# Patient Record
Sex: MALE | Race: BLACK OR AFRICAN AMERICAN | NOT HISPANIC OR LATINO | Employment: UNEMPLOYED | URBAN - METROPOLITAN AREA
[De-identification: names, ages, dates, MRNs, and addresses within clinical notes are randomized per-mention and may not be internally consistent; named-entity substitution may affect disease eponyms.]

---

## 2017-03-01 ENCOUNTER — HOSPITAL ENCOUNTER (EMERGENCY)
Facility: HOSPITAL | Age: 52
Discharge: OTHER INSTITUTION | End: 2017-03-01
Attending: EMERGENCY MEDICINE
Payer: MEDICAID

## 2017-03-01 ENCOUNTER — HOSPITAL ENCOUNTER (INPATIENT)
Facility: HOSPITAL | Age: 52
LOS: 2 days | Discharge: HOME | DRG: 641 | End: 2017-03-03
Attending: HOSPITALIST | Admitting: HOSPITALIST
Payer: MEDICAID

## 2017-03-01 VITALS
TEMPERATURE: 97 F | OXYGEN SATURATION: 97 % | HEIGHT: 71 IN | WEIGHT: 162 LBS | BODY MASS INDEX: 22.68 KG/M2 | RESPIRATION RATE: 20 BRPM | HEART RATE: 46 BPM | SYSTOLIC BLOOD PRESSURE: 146 MMHG | DIASTOLIC BLOOD PRESSURE: 71 MMHG

## 2017-03-01 DIAGNOSIS — D72.829 LEUKOCYTOSIS, UNSPECIFIED TYPE: ICD-10-CM

## 2017-03-01 DIAGNOSIS — N28.89 BILATERAL RENAL MASSES: ICD-10-CM

## 2017-03-01 DIAGNOSIS — D72.829 LEUKOCYTOSIS, UNSPECIFIED TYPE: Primary | ICD-10-CM

## 2017-03-01 DIAGNOSIS — E83.52 HYPERCALCEMIA: ICD-10-CM

## 2017-03-01 DIAGNOSIS — Z72.0 TOBACCO ABUSE: ICD-10-CM

## 2017-03-01 DIAGNOSIS — R10.9 INTRACTABLE ABDOMINAL PAIN: Primary | ICD-10-CM

## 2017-03-01 PROBLEM — R11.2 NAUSEA AND VOMITING: Status: ACTIVE | Noted: 2017-03-01

## 2017-03-01 PROBLEM — I10 ESSENTIAL HYPERTENSION: Status: ACTIVE | Noted: 2017-03-01

## 2017-03-01 PROBLEM — N28.1 BILATERAL RENAL CYSTS: Status: ACTIVE | Noted: 2017-03-01

## 2017-03-01 PROBLEM — K21.9 GERD (GASTROESOPHAGEAL REFLUX DISEASE): Chronic | Status: ACTIVE | Noted: 2017-03-01

## 2017-03-01 PROBLEM — R17 TOTAL BILIRUBIN, ELEVATED: Status: ACTIVE | Noted: 2017-03-01

## 2017-03-01 LAB
ALBUMIN SERPL BCP-MCNC: 4.6 G/DL
ALP SERPL-CCNC: 72 U/L
ALT SERPL W/O P-5'-P-CCNC: 21 U/L
ANION GAP SERPL CALC-SCNC: 19 MMOL/L
ANISOCYTOSIS BLD QL SMEAR: SLIGHT
AST SERPL-CCNC: 23 U/L
BACTERIA #/AREA URNS HPF: NORMAL /HPF
BASOPHILS NFR BLD: 1 %
BILIRUB SERPL-MCNC: 1.4 MG/DL
BILIRUB UR QL STRIP: ABNORMAL
BUN SERPL-MCNC: 15 MG/DL
CALCIUM SERPL-MCNC: 12.2 MG/DL
CHLORIDE SERPL-SCNC: 97 MMOL/L
CLARITY UR: CLEAR
CO2 SERPL-SCNC: 21 MMOL/L
COLOR UR: YELLOW
CORTIS SERPL-MCNC: 39.9 UG/DL
CREAT SERPL-MCNC: 1.1 MG/DL
DIFFERENTIAL METHOD: ABNORMAL
EOSINOPHIL NFR BLD: 1 %
ERYTHROCYTE [DISTWIDTH] IN BLOOD BY AUTOMATED COUNT: 15.3 %
EST. GFR  (AFRICAN AMERICAN): >60 ML/MIN/1.73 M^2
EST. GFR  (NON AFRICAN AMERICAN): >60 ML/MIN/1.73 M^2
FLUAV AG SPEC QL IA: NEGATIVE
FLUBV AG SPEC QL IA: NEGATIVE
GLUCOSE SERPL-MCNC: 121 MG/DL
GLUCOSE UR QL STRIP: NEGATIVE
HCT VFR BLD AUTO: 48.4 %
HGB BLD-MCNC: 15.9 G/DL
HGB UR QL STRIP: ABNORMAL
HYALINE CASTS #/AREA URNS LPF: 0 /LPF
KETONES UR QL STRIP: ABNORMAL
LEUKOCYTE ESTERASE UR QL STRIP: NEGATIVE
LIPASE SERPL-CCNC: 12 U/L
LYMPHOCYTES NFR BLD: 18 %
MAGNESIUM SERPL-MCNC: 2.2 MG/DL
MCH RBC QN AUTO: 30 PG
MCHC RBC AUTO-ENTMCNC: 32.9 %
MCV RBC AUTO: 91 FL
MICROSCOPIC COMMENT: NORMAL
MONOCYTES NFR BLD: 3 %
NEUTROPHILS NFR BLD: 76 %
NEUTS BAND NFR BLD MANUAL: 1 %
NITRITE UR QL STRIP: NEGATIVE
PH UR STRIP: 6 [PH] (ref 5–8)
PLATELET # BLD AUTO: 359 K/UL
PLATELET BLD QL SMEAR: ABNORMAL
PMV BLD AUTO: 8.4 FL
POTASSIUM SERPL-SCNC: 3.8 MMOL/L
PROT SERPL-MCNC: 7.6 G/DL
PROT UR QL STRIP: ABNORMAL
RBC # BLD AUTO: 5.31 M/UL
RBC #/AREA URNS HPF: 1 /HPF (ref 0–4)
SODIUM SERPL-SCNC: 137 MMOL/L
SP GR UR STRIP: 1.01 (ref 1–1.03)
SPECIMEN SOURCE: NORMAL
SQUAMOUS #/AREA URNS HPF: 1 /HPF
TROPONIN I SERPL DL<=0.01 NG/ML-MCNC: 0.01 NG/ML
TSH SERPL DL<=0.005 MIU/L-ACNC: 0.44 UIU/ML
URN SPEC COLLECT METH UR: ABNORMAL
UROBILINOGEN UR STRIP-ACNC: NEGATIVE EU/DL
WBC # BLD AUTO: 18.5 K/UL
WBC #/AREA URNS HPF: 0 /HPF (ref 0–5)

## 2017-03-01 PROCEDURE — 85027 COMPLETE CBC AUTOMATED: CPT

## 2017-03-01 PROCEDURE — 84443 ASSAY THYROID STIM HORMONE: CPT

## 2017-03-01 PROCEDURE — 87400 INFLUENZA A/B EACH AG IA: CPT

## 2017-03-01 PROCEDURE — 11000001 HC ACUTE MED/SURG PRIVATE ROOM

## 2017-03-01 PROCEDURE — 63600175 PHARM REV CODE 636 W HCPCS: Performed by: EMERGENCY MEDICINE

## 2017-03-01 PROCEDURE — 85007 BL SMEAR W/DIFF WBC COUNT: CPT

## 2017-03-01 PROCEDURE — 96376 TX/PRO/DX INJ SAME DRUG ADON: CPT

## 2017-03-01 PROCEDURE — 96375 TX/PRO/DX INJ NEW DRUG ADDON: CPT

## 2017-03-01 PROCEDURE — 80053 COMPREHEN METABOLIC PANEL: CPT

## 2017-03-01 PROCEDURE — 96361 HYDRATE IV INFUSION ADD-ON: CPT

## 2017-03-01 PROCEDURE — 99285 EMERGENCY DEPT VISIT HI MDM: CPT | Mod: 25

## 2017-03-01 PROCEDURE — 84484 ASSAY OF TROPONIN QUANT: CPT

## 2017-03-01 PROCEDURE — 25500020 PHARM REV CODE 255

## 2017-03-01 PROCEDURE — 25000003 PHARM REV CODE 250: Performed by: EMERGENCY MEDICINE

## 2017-03-01 PROCEDURE — 25000003 PHARM REV CODE 250: Performed by: INTERNAL MEDICINE

## 2017-03-01 PROCEDURE — 96365 THER/PROPH/DIAG IV INF INIT: CPT | Mod: 59

## 2017-03-01 PROCEDURE — 63600175 PHARM REV CODE 636 W HCPCS: Performed by: INTERNAL MEDICINE

## 2017-03-01 PROCEDURE — 82533 TOTAL CORTISOL: CPT

## 2017-03-01 PROCEDURE — 81000 URINALYSIS NONAUTO W/SCOPE: CPT

## 2017-03-01 PROCEDURE — 83735 ASSAY OF MAGNESIUM: CPT

## 2017-03-01 PROCEDURE — 93005 ELECTROCARDIOGRAM TRACING: CPT

## 2017-03-01 PROCEDURE — 83690 ASSAY OF LIPASE: CPT

## 2017-03-01 PROCEDURE — 36415 COLL VENOUS BLD VENIPUNCTURE: CPT

## 2017-03-01 RX ORDER — MORPHINE SULFATE 2 MG/ML
6 INJECTION, SOLUTION INTRAMUSCULAR; INTRAVENOUS
Status: DISCONTINUED | OUTPATIENT
Start: 2017-03-01 | End: 2017-03-01

## 2017-03-01 RX ORDER — ONDANSETRON 4 MG/1
4 TABLET, ORALLY DISINTEGRATING ORAL EVERY 8 HOURS PRN
Status: DISCONTINUED | OUTPATIENT
Start: 2017-03-02 | End: 2017-03-02

## 2017-03-01 RX ORDER — ENOXAPARIN SODIUM 100 MG/ML
40 INJECTION SUBCUTANEOUS EVERY 24 HOURS
Status: DISCONTINUED | OUTPATIENT
Start: 2017-03-02 | End: 2017-03-02

## 2017-03-01 RX ORDER — DIPHENHYDRAMINE HYDROCHLORIDE 50 MG/ML
12.5 INJECTION INTRAMUSCULAR; INTRAVENOUS
Status: COMPLETED | OUTPATIENT
Start: 2017-03-01 | End: 2017-03-01

## 2017-03-01 RX ORDER — OXYCODONE HYDROCHLORIDE 5 MG/1
5 TABLET ORAL EVERY 6 HOURS PRN
Status: DISCONTINUED | OUTPATIENT
Start: 2017-03-02 | End: 2017-03-02

## 2017-03-01 RX ORDER — MORPHINE SULFATE 4 MG/ML
4 INJECTION, SOLUTION INTRAMUSCULAR; INTRAVENOUS ONCE
Status: COMPLETED | OUTPATIENT
Start: 2017-03-01 | End: 2017-03-01

## 2017-03-01 RX ORDER — ONDANSETRON 2 MG/ML
4 INJECTION INTRAMUSCULAR; INTRAVENOUS ONCE
Status: COMPLETED | OUTPATIENT
Start: 2017-03-01 | End: 2017-03-01

## 2017-03-01 RX ORDER — HYDROMORPHONE HYDROCHLORIDE 1 MG/ML
1 INJECTION, SOLUTION INTRAMUSCULAR; INTRAVENOUS; SUBCUTANEOUS
Status: COMPLETED | OUTPATIENT
Start: 2017-03-01 | End: 2017-03-01

## 2017-03-01 RX ORDER — ONDANSETRON 2 MG/ML
4 INJECTION INTRAMUSCULAR; INTRAVENOUS
Status: COMPLETED | OUTPATIENT
Start: 2017-03-01 | End: 2017-03-01

## 2017-03-01 RX ORDER — HYDROMORPHONE HYDROCHLORIDE 1 MG/ML
0.25 INJECTION, SOLUTION INTRAMUSCULAR; INTRAVENOUS; SUBCUTANEOUS
Status: COMPLETED | OUTPATIENT
Start: 2017-03-01 | End: 2017-03-01

## 2017-03-01 RX ORDER — LISINOPRIL 10 MG/1
10 TABLET ORAL DAILY
Status: DISCONTINUED | OUTPATIENT
Start: 2017-03-01 | End: 2017-03-03

## 2017-03-01 RX ORDER — HYDROMORPHONE HYDROCHLORIDE 1 MG/ML
0.5 INJECTION, SOLUTION INTRAMUSCULAR; INTRAVENOUS; SUBCUTANEOUS EVERY 6 HOURS PRN
Status: DISCONTINUED | OUTPATIENT
Start: 2017-03-01 | End: 2017-03-01 | Stop reason: HOSPADM

## 2017-03-01 RX ORDER — SODIUM CHLORIDE 9 MG/ML
1000 INJECTION, SOLUTION INTRAVENOUS
Status: COMPLETED | OUTPATIENT
Start: 2017-03-01 | End: 2017-03-01

## 2017-03-01 RX ORDER — ONDANSETRON 2 MG/ML
4 INJECTION INTRAMUSCULAR; INTRAVENOUS EVERY 8 HOURS PRN
Status: DISCONTINUED | OUTPATIENT
Start: 2017-03-02 | End: 2017-03-02

## 2017-03-01 RX ADMIN — IOHEXOL 75 ML: 350 INJECTION, SOLUTION INTRAVENOUS at 01:03

## 2017-03-01 RX ADMIN — SODIUM CHLORIDE 1000 ML: 0.9 INJECTION, SOLUTION INTRAVENOUS at 02:03

## 2017-03-01 RX ADMIN — DIPHENHYDRAMINE HYDROCHLORIDE 12.5 MG: 50 INJECTION, SOLUTION INTRAMUSCULAR; INTRAVENOUS at 08:03

## 2017-03-01 RX ADMIN — LIDOCAINE HYDROCHLORIDE: 20 SOLUTION ORAL; TOPICAL at 12:03

## 2017-03-01 RX ADMIN — PROMETHAZINE HYDROCHLORIDE 12.5 MG: 25 INJECTION, SOLUTION INTRAMUSCULAR; INTRAVENOUS at 03:03

## 2017-03-01 RX ADMIN — LISINOPRIL 10 MG: 10 TABLET ORAL at 11:03

## 2017-03-01 RX ADMIN — HYDROMORPHONE HYDROCHLORIDE 0.25 MG: 1 INJECTION, SOLUTION INTRAMUSCULAR; INTRAVENOUS; SUBCUTANEOUS at 08:03

## 2017-03-01 RX ADMIN — SODIUM CHLORIDE 1000 ML: 0.9 INJECTION, SOLUTION INTRAVENOUS at 12:03

## 2017-03-01 RX ADMIN — HYDROMORPHONE HYDROCHLORIDE 0.5 MG: 1 INJECTION, SOLUTION INTRAMUSCULAR; INTRAVENOUS; SUBCUTANEOUS at 05:03

## 2017-03-01 RX ADMIN — ONDANSETRON 4 MG: 2 INJECTION INTRAMUSCULAR; INTRAVENOUS at 05:03

## 2017-03-01 RX ADMIN — HYDROMORPHONE HYDROCHLORIDE 1 MG: 1 INJECTION, SOLUTION INTRAMUSCULAR; INTRAVENOUS; SUBCUTANEOUS at 12:03

## 2017-03-01 RX ADMIN — ONDANSETRON 4 MG: 2 INJECTION INTRAMUSCULAR; INTRAVENOUS at 11:03

## 2017-03-01 RX ADMIN — PROMETHAZINE HYDROCHLORIDE 12.5 MG: 25 INJECTION, SOLUTION INTRAMUSCULAR; INTRAVENOUS at 12:03

## 2017-03-01 RX ADMIN — MORPHINE SULFATE 4 MG: 4 INJECTION INTRAVENOUS at 11:03

## 2017-03-01 NOTE — ED PROVIDER NOTES
"Encounter Date: 3/1/2017    SCRIBE #1 NOTE: I, Shivanirachel Spangler, am scribing for, and in the presence of, Dr. Gallagher.       History     Chief Complaint   Patient presents with    Abdominal Pain     "gastritis".  Hx of same.  Onset of symptoms last night.  Nausea, Vomiting.  Diarrhea.      Review of patient's allergies indicates:  No Known Allergies  HPI Comments: 3/1/2017  11:41 AM     Chief Complaint: Abdominal pain    The patient is a 51 y.o. male with no pmhx who presents with abdominal pain on and off for years. Patient states his current sharp lower abdominal pain started yesterday. Associated symptoms include nausea, vomiting, diarrhea and black stools. Pt has a hx of acid reflux and was taking nexium but ran out of his prescription. Patient does admit to drinking alcohol over the [place] holiday this past weekend. No fever, cp or sob. No known hx of pancreatitis. No shx.     The history is provided by the patient.     No past medical history on file.  No past surgical history on file.  No family history on file.  Social History   Substance Use Topics    Smoking status: Not on file    Smokeless tobacco: Not on file    Alcohol use Not on file     Review of Systems   Constitutional: Negative for appetite change, chills and fever.   HENT: Negative for congestion, rhinorrhea and sore throat.    Respiratory: Negative for cough and shortness of breath.    Cardiovascular: Negative for chest pain.   Gastrointestinal: Positive for abdominal pain, diarrhea, nausea and vomiting.        +Black stools.   Genitourinary: Negative for dysuria.   Musculoskeletal: Negative for back pain and myalgias.   Skin: Negative for rash.   Neurological: Negative for weakness and numbness.   Hematological: Does not bruise/bleed easily.   All other systems reviewed and are negative.      Physical Exam   Initial Vitals   BP Pulse Resp Temp SpO2   03/01/17 1108 03/01/17 1108 03/01/17 1108 03/01/17 1108 03/01/17 1108   181/90 66 20 97.5 " °F (36.4 °C) 98 %     Physical Exam    Nursing note and vitals reviewed.  Constitutional: No distress.   HENT:   Head: Normocephalic and atraumatic.   Mouth/Throat: Oropharynx is clear and moist and mucous membranes are normal.   Eyes: EOM are normal. Pupils are equal, round, and reactive to light.   Neck: Normal range of motion. Neck supple.   Cardiovascular: Normal rate, regular rhythm, normal heart sounds and intact distal pulses. Exam reveals no gallop and no friction rub.    No murmur heard.  CTA.   Pulmonary/Chest: Breath sounds normal. He has no wheezes. He has no rhonchi. He has no rales.   Abdominal: Soft. He exhibits no distension. There is generalized tenderness and tenderness in the right lower quadrant, suprapubic area and left lower quadrant. There is no rigidity, no rebound and no guarding.   Hypermobile bowel sounds.   Musculoskeletal: Normal range of motion. He exhibits no edema.   Neurological: He is alert and oriented to person, place, and time. He has normal strength.   Skin: Skin is dry. No rash noted. No erythema.   Psychiatric: He has a normal mood and affect.         ED Course   Procedures  Labs Reviewed - No data to display  EKG Readings: (Independently Interpreted)   Sinus bradycardia at a rate of 50. Voltage criteria for LDH. Precordial leads. Early repolarization. Normal axis and intervals.          Medical Decision Making:   Initial Assessment:   Patient has a few concerning hallmarks of his history and examination.  He has a diffusely tender abdomen that is nonrigid.  He reports he has been progressing with weight loss for some time of unknown etiology.  Screening blood work and imaging, including a CT scan of the abdomen and pelvis with IV dye will be obtained.  Differential Diagnosis:   DX include, but are not limited to, intra-abdominal carcinoma, appendicitis, pancreatitis, small bowel obstruction, ileus, pyelonephritis  ED Management:  Patient required multiple doses of analgesic  and antiemetic medication.  Workup today significant for a leukocytosis of 18.5 without focal source of infection in the abdomen.  Calcium is noted be markedly elevated at 12.2.  He was provided multiple boluses of saline.  CT scan indicates low density masses in the upper poles each kidney which appear small this time.  Patient does warrant further consultation for workup of his hypercalcemia and for pain control.  He is in agreement with this.  I did speak with the on-call endocrinologist at [facility], as we do not have that service here regarding acceptance for consultation to which they agreed.  I spoke to the on-call hospitalist at [facility] who agreed to see the patient on transfer.  Transferred per EMS in guarded condition.            Scribe Attestation:   Scribe #1: I performed the above scribed service and the documentation accurately describes the services I performed. I attest to the accuracy of the note.    Attending Attestation:           Physician Attestation for Scribe:  Physician Attestation Statement for Scribe #1: I, Dr. Gallagher, reviewed documentation, as scribed by Shivani Spangler in my presence, and it is both accurate and complete.                 ED Course     Clinical Impression:   The primary encounter diagnosis was Intractable abdominal pain. Diagnoses of Leukocytosis, unspecified type, Hypercalcemia, and Bilateral renal masses were also pertinent to this visit.      Disposition:   Disposition: Transferred  Condition: Pepito Gallagher MD  03/01/17 2026

## 2017-03-01 NOTE — ED NOTES
Pt sleeping with blanket over head, diaphoretic but states he's cold. Afebrile. Family member at bedside.

## 2017-03-01 NOTE — IP AVS SNAPSHOT
[facility]  [address]  [facility] [address]  Phone: [phone]           Patient Discharge Instructions     Our goal is to set you up for success. This packet includes information on your condition, medications, and your home care. It will help you to care for yourself so you don't get sicker and need to go back to the hospital.     Please ask your nurse if you have any questions.        There are many details to remember when preparing to leave the hospital. Here is what you will need to do:    1. Take your medicine. If you are prescribed medications, review your Medication List in the following pages. You may have new medications to  at the pharmacy and others that you'll need to stop taking. Review the instructions for how and when to take your medications. Talk with your doctor or nurses if you are unsure of what to do.     2. Go to your follow-up appointments. Specific follow-up information is listed in the following pages. Your may be contacted by a transition nurse or clinical provider about future appointments. Be sure we have all of the phone numbers to reach you, if needed. Please contact your provider's office if you are unable to make an appointment.     3. Watch for warning signs. Your doctor or nurse will give you detailed warning signs to watch for and when to call for assistance. These instructions may also include educational information about your condition. If you experience any of warning signs to your health, call your doctor.               [facility]  Unless otherwise directed by your provider, please contact [facility], our nurse care line that is available for 24/7 assistance.     [phone] (toll-free)    Registered nurses in the [facility] provide clinical advisement, health education, appointment booking, and other advisory services.                    ** Verify the list of medication(s) below is accurate and up  to date. Carry this with you in case of emergency. If your medications have changed, please notify your healthcare provider.             Medication List      START taking these medications        Additional Info                      ciprofloxacin HCl 500 MG tablet   Commonly known as:  CIPRO   Quantity:  26 tablet   Refills:  0   Dose:  500 mg   Indications:  Urinary Tract Infection    Last time this was given:  500 mg on 3/3/2017  8:55 AM   Instructions:  Take 1 tablet (500 mg total) by mouth every 12 (twelve) hours.     Begin Date    AM    Noon    PM    Bedtime       nicotine 14 mg/24 hr   Commonly known as:  NICODERM CQ   Refills:  0   Dose:  1 patch    Last time this was given:  1 patch on 3/3/2017  8:55 AM   Instructions:  Place 1 patch onto the skin once daily.     Begin Date    AM    Noon    PM    Bedtime       pantoprazole 40 MG tablet   Commonly known as:  PROTONIX   Quantity:  30 tablet   Refills:  2   Dose:  40 mg    Last time this was given:  40 mg on 3/3/2017  8:55 AM   Instructions:  Take 1 tablet (40 mg total) by mouth once daily.     Begin Date    AM    Noon    PM    Bedtime       promethazine 12.5 MG Tab   Commonly known as:  PHENERGAN   Quantity:  30 tablet   Refills:  1   Dose:  12.5 mg    Last time this was given:  12.5 mg on 3/3/2017  5:23 AM   Instructions:  Take 1 tablet (12.5 mg total) by mouth every 6 (six) hours as needed.     Begin Date    AM    Noon    PM    Bedtime       tamsulosin 0.4 mg Cp24   Commonly known as:  FLOMAX   Quantity:  30 capsule   Refills:  2   Dose:  0.4 mg    Last time this was given:  0.4 mg on 3/3/2017  8:54 AM   Instructions:  Take 1 capsule (0.4 mg total) by mouth once daily.     Begin Date    AM    Noon    PM    Bedtime         STOP taking these medications     ranitidine 150 MG tablet   Commonly known as:  ZANTAC            Where to Get Your Medications      These medications were sent to [facility] [place] [address]  [address] [facility] [address]     Phone:  [phone]     ciprofloxacin HCl 500 MG tablet    pantoprazole 40 MG tablet    promethazine 12.5 MG Tab    tamsulosin 0.4 mg Cp24         You can get these medications from any pharmacy     You don't need a prescription for these medications     nicotine 14 mg/24 hr                  Please bring to all follow up appointments:    1. A copy of your discharge instructions.  2. All medicines you are currently taking in their original bottles.  3. Identification and insurance card.    Please arrive 15 minutes ahead of scheduled appointment time.    Please call 24 hours in advance if you must reschedule your appointment and/or time.        Your Scheduled Appointments     Mar 09, 2017 11:00 AM [facility]   Hospital Follow Up with JAREK Andujar [facility] ([facility])    [address]  [place] [address] [phone]              Follow-up Information     Follow up with [facility] On 3/9/2017.    Specialty:  Priority Care    Why:  11:00am - bring a copy of your discharge paper work    Contact information:    [address] [facility] [address] [phone]    Additional information:    [facility]        Discharge Instructions     Future Orders    Activity as tolerated     Call MD for:  difficulty breathing or increased cough     Call MD for:  increased confusion or weakness     Call MD for:  persistent dizziness, light-headedness, or visual disturbances     Call MD for:  persistent nausea and vomiting or diarrhea     Call MD for:  redness, tenderness, or signs of infection (pain, swelling, redness, odor or green/yellow discharge around incision site)     Call MD for:  severe persistent headache     Call MD for:  severe uncontrolled pain     Call MD for:  temperature >100.4     Call MD for:  worsening rash  "    Diet general     Questions:    Total calories:      Fat restriction, if any:      Protein restriction, if any:      Na restriction, if any:      Fluid restriction:      Additional restrictions:        Discharge References/Attachments     GERD (ADULT) (ENGLISH)        Primary Diagnosis     Your primary diagnosis was:  Intractable Vomiting With Nausea      Admission Information     Date & Time Provider Department CSN    3/1/2017 11:02 PM Randolph Saucedo MD [facility] [id number]      Care Providers     Provider Role Specialty Primary office phone    Randolph Saucedo MD Attending Provider Hospitalist [phone]    Randolph Saucedo MD Team Attending  Hospitalist [phone]      Your Vitals Were     BP Pulse Temp Resp Height Weight    110/66 (BP Location: Left arm, Patient Position: Lying, BP Method: Automatic) 57 98.5 °F (36.9 °C) (Oral) 16 5' 11" (1.803 m) 74.8 kg (165 lb)    SpO2 BMI             98% 23.01 kg/m2         Recent Lab Values        3/2/2017                           8:31 AM           A1C 4.9           Comment for A1C at  8:31 AM on 3/2/2017:  According to ADA guidelines, hemoglobin A1C <7.0% represents  optimal control in non-pregnant diabetic patients.  Different  metrics may apply to specific populations.   Standards of Medical Care in Diabetes - 2016.  For the purpose of screening for the presence of diabetes:  <5.7%     Consistent with the absence of diabetes  5.7-6.4%  Consistent with increasing risk for diabetes   (prediabetes)  >or=6.5%  Consistent with diabetes  Currently no consensus exists for use of hemoglobin A1C  for diagnosis of diabetes for children.        Pending Labs     Order Current Status    PTH-related peptide In process      Allergies as of 3/3/2017     No Known Allergies      Advance Directives     An advance directive is a document which, in the event you are no longer able to make decisions for yourself, tells your healthcare team what kind of treatment you do or " do not want to receive, or who you would like to make those decisions for you.  If you do not currently have an advance directive, [facility] encourages you to create one.  For more information call:  [phone]), [phone] ([phone]),  or log on to [email or web address].        Smoking Cessation     If you would like to quit smoking:   You may be eligible for free services if you are a [place] resident and started smoking cigarettes before September 1, 1988.  Call the Smoking Cessation Trust (SCT) toll free at (489) 774-1840 or (899) 532-1630.   Call 8-900-QUIT-NOW if you do not meet the above criteria.            Language Assistance Services     ATTENTION: Language assistance services are available, free of charge. Please call [phone].      ATENCIÓN: Si habla español, tiene a mejia disposición servicios gratuitos de asistencia lingüística. Llame al [phone].     CHÚ Ý: N?u b?n nói Ti?ng Vi?t, có các d?ch v? h? tr? ngôn ng? mi?n phí dành cho b?n. G?i s? [phone].        [facility] Sign-Up     Activating your [facility] account is as easy as 1-2-3!     1) Visit [email or web address], select Sign Up Now, enter this activation code and your date of birth, then select Next.  [email or web address]  Expires: 4/17/2017 11:17 AM      2) Create a username and password to use when you visit [facility] in the future and select a security question in case you lose your password and select Next.    3) Enter your e-mail address and click Sign Up!    Additional Information  If you have questions, please e-mail [email or web address] or call [phone] to talk to our [facility] staff. Remember, [facility] is NOT to be used for urgent needs. For medical emergencies, dial 911.          [facility] complies with applicable Federal civil rights laws and does not discriminate on the basis of race, color, national origin, age, disability, or sex.

## 2017-03-01 NOTE — PLAN OF CARE
Outside Transfer Acceptance Note    Transferring Physician or Mid Level Provider/Speciality: Dr Gallagher    Accepting Physician: Nell Ellsworth     Date of Acceptance: 03/01/2017     Code Status: Full    Transferring Facility/Hospital: [facility]    Reason for Transfer to [facility]: hypercalcemia, abd pain.  Nausea and vomiting.     Report from Transferring Physician or Mid-Level provider/Hospital course: 51 M with a PMH of GERD who presented to the ED today with intractable abdominal pain, nausea and vomiting x 3 day.  Also complains of weight loss and general malaise for the last month or so.  Calcium 12.2.  Low-density masses in the upper poles of each kidney which are too small to characterize. Spoke with endocrinology here who would like him to come over for a workup.     To do list upon patient arrival: consult endocrine as it was the reason for transfer.  Would also initiate general workup for hypercalcemia.     Please call extension [phone] upon patient arrival to floor for Hospital Medicine admit team assignment and for additional admit orders. If patient is coming from another [facility] facility please also call [phone] to inform the admit team/office that patient has arrived from the [facility] facility to the floor so patient can be evaluated.

## 2017-03-01 NOTE — ED NOTES
"Pt presents to ED with c/o nausea, vomiting, and diarrhea that began last night. Pt reports "it feels like my gastritis."  Pt is AAOx4. Skin is diaphoretic. Pt is not vomiting at this time. Pt reports generalized abdominal tenderness with palpation. Mucous membranes are pink and moist. VSS.    "

## 2017-03-02 PROBLEM — N40.0 PROSTATE ENLARGEMENT: Status: ACTIVE | Noted: 2017-03-02

## 2017-03-02 PROBLEM — R74.8 ABNORMAL LIVER ENZYMES: Status: ACTIVE | Noted: 2017-03-01

## 2017-03-02 PROBLEM — E86.0 DEHYDRATION: Status: ACTIVE | Noted: 2017-03-02

## 2017-03-02 PROBLEM — K76.0 HEPATIC STEATOSIS: Status: ACTIVE | Noted: 2017-03-02

## 2017-03-02 PROBLEM — N41.1 CHRONIC PROSTATITIS: Status: ACTIVE | Noted: 2017-03-02

## 2017-03-02 PROBLEM — K21.9 GASTROESOPHAGEAL REFLUX DISEASE WITHOUT ESOPHAGITIS: Status: ACTIVE | Noted: 2017-03-01

## 2017-03-02 PROBLEM — N41.0 ACUTE PROSTATITIS: Status: ACTIVE | Noted: 2017-03-02

## 2017-03-02 PROBLEM — Z72.0 TOBACCO ABUSE: Status: ACTIVE | Noted: 2017-03-02

## 2017-03-02 LAB
25(OH)D3+25(OH)D2 SERPL-MCNC: <8 NG/ML
ALBUMIN SERPL BCP-MCNC: 3.7 G/DL
ALP SERPL-CCNC: 61 U/L
ALT SERPL W/O P-5'-P-CCNC: 16 U/L
ANION GAP SERPL CALC-SCNC: 10 MMOL/L
ANION GAP SERPL CALC-SCNC: 11 MMOL/L
AST SERPL-CCNC: 16 U/L
BASOPHILS # BLD AUTO: 0.02 K/UL
BASOPHILS NFR BLD: 0.2 %
BILIRUB SERPL-MCNC: 1.3 MG/DL
BILIRUB UR QL STRIP: NEGATIVE
BUN SERPL-MCNC: 11 MG/DL
BUN SERPL-MCNC: 12 MG/DL
CA-I BLDV-SCNC: 1.34 MMOL/L
CALCIUM SERPL-MCNC: 10.8 MG/DL
CALCIUM SERPL-MCNC: 10.9 MG/DL
CHLORIDE SERPL-SCNC: 100 MMOL/L
CHLORIDE SERPL-SCNC: 102 MMOL/L
CLARITY UR REFRACT.AUTO: ABNORMAL
CO2 SERPL-SCNC: 25 MMOL/L
CO2 SERPL-SCNC: 25 MMOL/L
COLOR UR AUTO: YELLOW
CREAT SERPL-MCNC: 0.9 MG/DL
CREAT SERPL-MCNC: 1 MG/DL
DIFFERENTIAL METHOD: ABNORMAL
EOSINOPHIL # BLD AUTO: 0 K/UL
EOSINOPHIL NFR BLD: 0.2 %
ERYTHROCYTE [DISTWIDTH] IN BLOOD BY AUTOMATED COUNT: 14.9 %
EST. GFR  (AFRICAN AMERICAN): >60 ML/MIN/1.73 M^2
EST. GFR  (AFRICAN AMERICAN): >60 ML/MIN/1.73 M^2
EST. GFR  (NON AFRICAN AMERICAN): >60 ML/MIN/1.73 M^2
EST. GFR  (NON AFRICAN AMERICAN): >60 ML/MIN/1.73 M^2
ESTIMATED AVG GLUCOSE: 94 MG/DL
GLUCOSE SERPL-MCNC: 112 MG/DL
GLUCOSE SERPL-MCNC: 99 MG/DL
GLUCOSE UR QL STRIP: NEGATIVE
HBA1C MFR BLD HPLC: 4.9 %
HCT VFR BLD AUTO: 43.7 %
HGB BLD-MCNC: 14.7 G/DL
HGB UR QL STRIP: NEGATIVE
KETONES UR QL STRIP: ABNORMAL
LEUKOCYTE ESTERASE UR QL STRIP: NEGATIVE
LYMPHOCYTES # BLD AUTO: 1.6 K/UL
LYMPHOCYTES NFR BLD: 12.8 %
MAGNESIUM SERPL-MCNC: 2.2 MG/DL
MCH RBC QN AUTO: 30.9 PG
MCHC RBC AUTO-ENTMCNC: 33.6 %
MCV RBC AUTO: 92 FL
MONOCYTES # BLD AUTO: 1.4 K/UL
MONOCYTES NFR BLD: 11.1 %
NEUTROPHILS # BLD AUTO: 9.3 K/UL
NEUTROPHILS NFR BLD: 75.5 %
NITRITE UR QL STRIP: NEGATIVE
PH UR STRIP: 6 [PH] (ref 5–8)
PHOSPHATE SERPL-MCNC: 2.5 MG/DL
PLATELET # BLD AUTO: 293 K/UL
PMV BLD AUTO: 10.3 FL
POTASSIUM SERPL-SCNC: 4.1 MMOL/L
POTASSIUM SERPL-SCNC: 4.1 MMOL/L
PROCALCITONIN SERPL IA-MCNC: <0.09 NG/ML
PROT SERPL-MCNC: 6.5 G/DL
PROT UR QL STRIP: ABNORMAL
PTH-INTACT SERPL-MCNC: 78 PG/ML
PTH-INTACT SERPL-MCNC: 96 PG/ML
RBC # BLD AUTO: 4.75 M/UL
SODIUM SERPL-SCNC: 135 MMOL/L
SODIUM SERPL-SCNC: 138 MMOL/L
SP GR UR STRIP: 1.01 (ref 1–1.03)
URN SPEC COLLECT METH UR: ABNORMAL
UROBILINOGEN UR STRIP-ACNC: NEGATIVE EU/DL
WBC # BLD AUTO: 12.34 K/UL

## 2017-03-02 PROCEDURE — 82330 ASSAY OF CALCIUM: CPT

## 2017-03-02 PROCEDURE — 83970 ASSAY OF PARATHORMONE: CPT | Mod: 91

## 2017-03-02 PROCEDURE — 99223 1ST HOSP IP/OBS HIGH 75: CPT | Mod: ,,, | Performed by: INTERNAL MEDICINE

## 2017-03-02 PROCEDURE — 93010 ELECTROCARDIOGRAM REPORT: CPT | Mod: ,,, | Performed by: INTERNAL MEDICINE

## 2017-03-02 PROCEDURE — 25000003 PHARM REV CODE 250: Performed by: STUDENT IN AN ORGANIZED HEALTH CARE EDUCATION/TRAINING PROGRAM

## 2017-03-02 PROCEDURE — 11000001 HC ACUTE MED/SURG PRIVATE ROOM

## 2017-03-02 PROCEDURE — 36415 COLL VENOUS BLD VENIPUNCTURE: CPT

## 2017-03-02 PROCEDURE — 82306 VITAMIN D 25 HYDROXY: CPT

## 2017-03-02 PROCEDURE — 80048 BASIC METABOLIC PNL TOTAL CA: CPT

## 2017-03-02 PROCEDURE — 63600175 PHARM REV CODE 636 W HCPCS: Performed by: INTERNAL MEDICINE

## 2017-03-02 PROCEDURE — 84100 ASSAY OF PHOSPHORUS: CPT

## 2017-03-02 PROCEDURE — 25000003 PHARM REV CODE 250: Performed by: INTERNAL MEDICINE

## 2017-03-02 PROCEDURE — 84145 PROCALCITONIN (PCT): CPT

## 2017-03-02 PROCEDURE — 63600175 PHARM REV CODE 636 W HCPCS: Performed by: STUDENT IN AN ORGANIZED HEALTH CARE EDUCATION/TRAINING PROGRAM

## 2017-03-02 PROCEDURE — 80053 COMPREHEN METABOLIC PANEL: CPT

## 2017-03-02 PROCEDURE — 93005 ELECTROCARDIOGRAM TRACING: CPT

## 2017-03-02 PROCEDURE — 85025 COMPLETE CBC W/AUTO DIFF WBC: CPT

## 2017-03-02 PROCEDURE — 82397 CHEMILUMINESCENT ASSAY: CPT

## 2017-03-02 PROCEDURE — 83735 ASSAY OF MAGNESIUM: CPT

## 2017-03-02 PROCEDURE — C9113 INJ PANTOPRAZOLE SODIUM, VIA: HCPCS | Performed by: STUDENT IN AN ORGANIZED HEALTH CARE EDUCATION/TRAINING PROGRAM

## 2017-03-02 PROCEDURE — 82652 VIT D 1 25-DIHYDROXY: CPT

## 2017-03-02 PROCEDURE — 83036 HEMOGLOBIN GLYCOSYLATED A1C: CPT

## 2017-03-02 PROCEDURE — 83970 ASSAY OF PARATHORMONE: CPT

## 2017-03-02 PROCEDURE — 81003 URINALYSIS AUTO W/O SCOPE: CPT

## 2017-03-02 RX ORDER — CIPROFLOXACIN 500 MG/1
500 TABLET ORAL EVERY 12 HOURS
Status: DISCONTINUED | OUTPATIENT
Start: 2017-03-02 | End: 2017-03-03 | Stop reason: HOSPADM

## 2017-03-02 RX ORDER — PROMETHAZINE HYDROCHLORIDE 12.5 MG/1
12.5 TABLET ORAL EVERY 6 HOURS PRN
Status: DISCONTINUED | OUTPATIENT
Start: 2017-03-02 | End: 2017-03-02

## 2017-03-02 RX ORDER — MORPHINE SULFATE 2 MG/ML
2 INJECTION, SOLUTION INTRAMUSCULAR; INTRAVENOUS EVERY 4 HOURS PRN
Status: DISCONTINUED | OUTPATIENT
Start: 2017-03-02 | End: 2017-03-02

## 2017-03-02 RX ORDER — IBUPROFEN 200 MG
1 TABLET ORAL DAILY
Status: DISCONTINUED | OUTPATIENT
Start: 2017-03-02 | End: 2017-03-03 | Stop reason: HOSPADM

## 2017-03-02 RX ORDER — PANTOPRAZOLE SODIUM 40 MG/10ML
40 INJECTION, POWDER, LYOPHILIZED, FOR SOLUTION INTRAVENOUS ONCE
Status: COMPLETED | OUTPATIENT
Start: 2017-03-02 | End: 2017-03-02

## 2017-03-02 RX ORDER — TAMSULOSIN HYDROCHLORIDE 0.4 MG/1
0.4 CAPSULE ORAL DAILY
Status: DISCONTINUED | OUTPATIENT
Start: 2017-03-02 | End: 2017-03-03 | Stop reason: HOSPADM

## 2017-03-02 RX ORDER — ONDANSETRON 2 MG/ML
8 INJECTION INTRAMUSCULAR; INTRAVENOUS EVERY 8 HOURS PRN
Status: DISCONTINUED | OUTPATIENT
Start: 2017-03-02 | End: 2017-03-03 | Stop reason: HOSPADM

## 2017-03-02 RX ORDER — PANTOPRAZOLE SODIUM 40 MG/1
40 TABLET, DELAYED RELEASE ORAL DAILY
Status: DISCONTINUED | OUTPATIENT
Start: 2017-03-03 | End: 2017-03-03 | Stop reason: HOSPADM

## 2017-03-02 RX ORDER — ACETAMINOPHEN 325 MG/1
650 TABLET ORAL EVERY 6 HOURS PRN
Status: DISCONTINUED | OUTPATIENT
Start: 2017-03-02 | End: 2017-03-03 | Stop reason: HOSPADM

## 2017-03-02 RX ORDER — PROMETHAZINE HYDROCHLORIDE 12.5 MG/1
12.5 TABLET ORAL EVERY 6 HOURS
Status: DISCONTINUED | OUTPATIENT
Start: 2017-03-02 | End: 2017-03-03 | Stop reason: HOSPADM

## 2017-03-02 RX ORDER — SODIUM CHLORIDE, SODIUM LACTATE, POTASSIUM CHLORIDE, CALCIUM CHLORIDE 600; 310; 30; 20 MG/100ML; MG/100ML; MG/100ML; MG/100ML
INJECTION, SOLUTION INTRAVENOUS CONTINUOUS
Status: ACTIVE | OUTPATIENT
Start: 2017-03-02 | End: 2017-03-02

## 2017-03-02 RX ADMIN — MORPHINE SULFATE 2 MG: 2 INJECTION, SOLUTION INTRAMUSCULAR; INTRAVENOUS at 09:03

## 2017-03-02 RX ADMIN — PANTOPRAZOLE SODIUM 40 MG: 40 INJECTION, POWDER, FOR SOLUTION INTRAVENOUS at 11:03

## 2017-03-02 RX ADMIN — CIPROFLOXACIN HYDROCHLORIDE 500 MG: 500 TABLET, FILM COATED ORAL at 09:03

## 2017-03-02 RX ADMIN — SODIUM CHLORIDE, SODIUM LACTATE, POTASSIUM CHLORIDE, AND CALCIUM CHLORIDE: .6; .31; .03; .02 INJECTION, SOLUTION INTRAVENOUS at 08:03

## 2017-03-02 RX ADMIN — NICOTINE 1 PATCH: 14 PATCH, EXTENDED RELEASE TRANSDERMAL at 08:03

## 2017-03-02 RX ADMIN — CIPROFLOXACIN HYDROCHLORIDE 500 MG: 500 TABLET, FILM COATED ORAL at 11:03

## 2017-03-02 RX ADMIN — TAMSULOSIN HYDROCHLORIDE 0.4 MG: 0.4 CAPSULE ORAL at 11:03

## 2017-03-02 RX ADMIN — ALUMINUM HYDROXIDE, MAGNESIUM HYDROXIDE, AND SIMETHICONE 50 ML: 200; 200; 20 SUSPENSION ORAL at 09:03

## 2017-03-02 RX ADMIN — PROMETHAZINE HYDROCHLORIDE 12.5 MG: 12.5 TABLET ORAL at 06:03

## 2017-03-02 RX ADMIN — ALUMINUM HYDROXIDE, MAGNESIUM HYDROXIDE, AND SIMETHICONE 50 ML: 200; 200; 20 SUSPENSION ORAL at 11:03

## 2017-03-02 RX ADMIN — PROMETHAZINE HYDROCHLORIDE 6.25 MG: 25 INJECTION, SOLUTION INTRAMUSCULAR; INTRAVENOUS at 10:03

## 2017-03-02 RX ADMIN — LISINOPRIL 10 MG: 10 TABLET ORAL at 08:03

## 2017-03-02 RX ADMIN — ONDANSETRON 4 MG: 4 TABLET, ORALLY DISINTEGRATING ORAL at 09:03

## 2017-03-02 RX ADMIN — ACETAMINOPHEN 650 MG: 325 TABLET ORAL at 09:03

## 2017-03-02 RX ADMIN — PROMETHAZINE HYDROCHLORIDE 12.5 MG: 12.5 TABLET ORAL at 11:03

## 2017-03-02 RX ADMIN — MORPHINE SULFATE 2 MG: 2 INJECTION, SOLUTION INTRAMUSCULAR; INTRAVENOUS at 05:03

## 2017-03-02 RX ADMIN — OXYCODONE HYDROCHLORIDE 5 MG: 5 TABLET ORAL at 04:03

## 2017-03-02 RX ADMIN — ONDANSETRON 4 MG: 2 INJECTION INTRAMUSCULAR; INTRAVENOUS at 05:03

## 2017-03-02 RX ADMIN — SODIUM CHLORIDE, SODIUM LACTATE, POTASSIUM CHLORIDE, AND CALCIUM CHLORIDE: .6; .31; .03; .02 INJECTION, SOLUTION INTRAVENOUS at 03:03

## 2017-03-02 NOTE — PLAN OF CARE
Problem: Fall Risk (Adult)  Goal: Absence of Falls  Patient will demonstrate the desired outcomes by discharge/transition of care.   Fall precaution maintained this shift call bell in reach bed in low position will monitor

## 2017-03-02 NOTE — ASSESSMENT & PLAN NOTE
- Mildly elevated total bilirubin.  - No significant LFT elevation; CT demonstrates mild hepatic steatosis.  - Will continue to monitor.

## 2017-03-02 NOTE — ASSESSMENT & PLAN NOTE
- BP severely elevated at 180s, 200s SBP. When pain controlled, SBP 140s.  - Will start lisinopril 10mg PO daily.

## 2017-03-02 NOTE — SUBJECTIVE & OBJECTIVE
Past Medical History:   Diagnosis Date    GERD (gastroesophageal reflux disease)      History reviewed. No pertinent surgical history.    Review of patient's allergies indicates:  No Known Allergies    Current Facility-Administered Medications on File Prior to Encounter   Medication    [COMPLETED] (pyxis) gi cocktail (mylanta 30 mL, lidocaine 2 % viscous 10 mL, dicyclomine 10 mL) 50 mL    [COMPLETED] 0.9%  NaCl infusion    [COMPLETED] diphenhydrAMINE injection 12.5 mg    [COMPLETED] hydromorphone (PF) injection 0.25 mg    [COMPLETED] hydromorphone (PF) injection 1 mg    [COMPLETED] omnipaque 350 iohexol 350 mg iodine/mL    [COMPLETED] ondansetron injection 4 mg    [COMPLETED] promethazine (PHENERGAN) 12.5 mg in dextrose 5 % 50 mL IVPB    [COMPLETED] promethazine (PHENERGAN) 12.5 mg in dextrose 5 % 50 mL IVPB    [COMPLETED] sodium chloride 0.9% bolus 1,000 mL    [DISCONTINUED] hydromorphone (PF) injection 0.5 mg    [DISCONTINUED] morphine injection 6 mg    [DISCONTINUED] promethazine (PHENERGAN) 12.5 mg in dextrose 5 % 50 mL IVPB    [DISCONTINUED] sodium chloride 0.9% bolus 1,000 mL     No current outpatient prescriptions on file prior to encounter.     Family History     None        Social History Main Topics    Smoking status: Current Every Day Smoker     Packs/day: 0.50     Years: 20.00     Types: Cigarettes    Smokeless tobacco: Never Used    Alcohol use Yes      Comment: 4 40oz a week    Drug use: Yes     Special: Marijuana    Sexual activity: Not on file     Review of Systems   Constitutional: Positive for chills, diaphoresis, fatigue and fever (Reports Tmax 103). Negative for unexpected weight change.   HENT: Positive for sore throat. Negative for trouble swallowing.    Eyes: Negative for pain and visual disturbance.   Respiratory: Positive for shortness of breath (with nausea/vomiting). Negative for cough.    Cardiovascular: Positive for palpitations. Negative for chest pain.    Gastrointestinal: Positive for abdominal pain, blood in stool (black stool), nausea and vomiting. Negative for constipation.   Genitourinary: Negative for difficulty urinating, dysuria and hematuria.   Musculoskeletal: Negative for arthralgias and myalgias.   Skin: Negative for rash and wound.   Neurological: Negative for light-headedness and numbness.     Objective:     Vital Signs (Most Recent):  Temp: 98.7 °F (37.1 °C) (03/01/17 2307)  Pulse: (!) 48 (03/01/17 2307)  Resp: 18 (03/01/17 2307)  BP: (!) 201/90 (03/01/17 2307)  SpO2: 97 % (03/01/17 2307) Vital Signs (24h Range):  Temp:  [97.1 °F (36.2 °C)-98.7 °F (37.1 °C)] 98.7 °F (37.1 °C)  Pulse:  [46-74] 48  Resp:  [18-20] 18  SpO2:  [97 %-99 %] 97 %  BP: (146-201)/(71-90) 201/90     Weight: 74.8 kg (165 lb)  Body mass index is 23.01 kg/(m^2).    Physical Exam   Constitutional: He is oriented to person, place, and time. He appears well-developed and well-nourished. He appears distressed (moderate distress).   HENT:   Head: Normocephalic and atraumatic.   Nose: Nose normal.   Mouth/Throat: Oropharynx is clear and moist.   Eyes: Pupils are equal, round, and reactive to light.   Cardiovascular: Normal rate, regular rhythm, normal heart sounds and intact distal pulses.    Pulmonary/Chest: Effort normal and breath sounds normal.   Abdominal: Soft. Bowel sounds are normal. There is tenderness. There is guarding. There is no rebound.   Genitourinary: Rectum normal.   Genitourinary Comments: Digital rectal exam shows soft, brown, guaiac negative stool.   Musculoskeletal: Normal range of motion. He exhibits no edema.   Neurological: He is alert and oriented to person, place, and time.   Reflexes 2+ bilateral biceps, 2+ bilateral patellar.   Skin: Skin is warm and dry. No rash noted.   Vitals reviewed.    Significant Labs:   Recent Results (from the past 24 hour(s))   CBC W/ AUTO DIFFERENTIAL    Collection Time: 03/01/17 12:04 PM   Result Value Ref Range    WBC 18.50 (H)  3.90 - 12.70 K/uL    RBC 5.31 4.60 - 6.20 M/uL    Hemoglobin 15.9 14.0 - 18.0 g/dL    Hematocrit 48.4 40.0 - 54.0 %    MCV 91 82 - 98 fL    MCH 30.0 27.0 - 31.0 pg    MCHC 32.9 32.0 - 36.0 %    RDW 15.3 (H) 11.5 - 14.5 %    Platelets 359 (H) 150 - 350 K/uL    MPV 8.4 (L) 9.2 - 12.9 fL    Gran% 76.0 (H) 38.0 - 73.0 %    Lymph% 18.0 18.0 - 48.0 %    Mono% 3.0 (L) 4.0 - 15.0 %    Eosinophil% 1.0 0.0 - 8.0 %    Basophil% 1.0 0.0 - 1.9 %    Bands 1.0 %    Platelet Estimate Appears normal     Aniso Slight     Differential Method Manual    Comp. Metabolic Panel    Collection Time: 03/01/17 12:04 PM   Result Value Ref Range    Sodium 137 136 - 145 mmol/L    Potassium 3.8 3.5 - 5.1 mmol/L    Chloride 97 95 - 110 mmol/L    CO2 21 (L) 23 - 29 mmol/L    Glucose 121 (H) 70 - 110 mg/dL    BUN, Bld 15 6 - 20 mg/dL    Creatinine 1.1 0.5 - 1.4 mg/dL    Calcium 12.2 (HH) 8.7 - 10.5 mg/dL    Total Protein 7.6 6.0 - 8.4 g/dL    Albumin 4.6 3.5 - 5.2 g/dL    Total Bilirubin 1.4 (H) 0.1 - 1.0 mg/dL    Alkaline Phosphatase 72 55 - 135 U/L    AST 23 10 - 40 U/L    ALT 21 10 - 44 U/L    Anion Gap 19 (H) 8 - 16 mmol/L    eGFR if African American >60 >60 mL/min/1.73 m^2    eGFR if non African American >60 >60 mL/min/1.73 m^2   Lipase    Collection Time: 03/01/17 12:04 PM   Result Value Ref Range    Lipase 12 4 - 60 U/L   Troponin I    Collection Time: 03/01/17 12:04 PM   Result Value Ref Range    Troponin I 0.009 0.000 - 0.026 ng/mL   Magnesium    Collection Time: 03/01/17 12:04 PM   Result Value Ref Range    Magnesium 2.2 1.6 - 2.6 mg/dL   TSH    Collection Time: 03/01/17 12:04 PM   Result Value Ref Range    TSH 0.438 0.400 - 4.000 uIU/mL   Cortisol    Collection Time: 03/01/17 12:04 PM   Result Value Ref Range    Cortisol 39.9 ug/dL   Influenza antigen Nasal Swab    Collection Time: 03/01/17 12:41 PM   Result Value Ref Range    Influenza A Ag, EIA Negative Negative    Influenza B Ag, EIA Negative Negative    Flu A & B Source Nasal Swab     Urinalysis    Collection Time: 03/01/17  5:04 PM   Result Value Ref Range    Specimen UA Urine, Clean Catch     Color, UA Yellow Yellow, Straw, Kitty    Appearance, UA Clear Clear    pH, UA 6.0 5.0 - 8.0    Specific Gravity, UA 1.015 1.005 - 1.030    Protein, UA 1+ (A) Negative    Glucose, UA Negative Negative    Ketones, UA 2+ (A) Negative    Bilirubin (UA) 1+ (A) Negative    Occult Blood UA Trace (A) Negative    Nitrite, UA Negative Negative    Urobilinogen, UA Negative <2.0 EU/dL    Leukocytes, UA Negative Negative   Urinalysis Microscopic    Collection Time: 03/01/17  5:04 PM   Result Value Ref Range    RBC, UA 1 0 - 4 /hpf    WBC, UA 0 0 - 5 /hpf    Bacteria, UA Rare None-Occ /hpf    Squam Epithel, UA 1 /hpf    Hyaline Casts, UA 0 0-1/lpf /lpf    Microscopic Comment SEE COMMENT      Significant Imaging:   CXR 03/01/17:  No acute process.    KUB 03/01/17:  No acute abdominal process.    CT Abdomen Pelvis W Contrast 03/01/17:  No acute CT findings in the abdomen or pelvis to explain this patient's symptoms. Mild hepatic steatosis. Moderate prostatomegaly. Low-density masses in the upper poles of each kidney which are too small to characterize.  These most likely represent small cysts but nonemergent renal ultrasound is recommended particularly for the right upper pole lesion for further evaluation.  The left upper pole lesion may be too small to visualize sonographically.

## 2017-03-02 NOTE — SUBJECTIVE & OBJECTIVE
Interval History: NAEON. VS wnl. Admitted overnight for n & v.   Review of Systems   Constitutional: Positive for fatigue. Negative for unexpected weight change.   HENT: Negative for trouble swallowing.    Eyes: Negative for pain and visual disturbance.   Respiratory: Positive for shortness of breath (with nausea/vomiting). Negative for cough.    Cardiovascular: Negative for chest pain.   Gastrointestinal: Positive for abdominal pain, nausea and vomiting. Negative for constipation.   Genitourinary: Negative for difficulty urinating, dysuria and hematuria.   Musculoskeletal: Negative for arthralgias and myalgias.   Skin: Negative for rash and wound.   Neurological: Negative for light-headedness and numbness.     Objective:     Vital Signs (Most Recent):  Temp: 98.5 °F (36.9 °C) (03/02/17 1210)  Pulse: 64 (03/02/17 1210)  Resp: 18 (03/02/17 1210)  BP: 129/62 (03/02/17 1210)  SpO2: 97 % (03/02/17 1210) Vital Signs (24h Range):  Temp:  [97.9 °F (36.6 °C)-98.7 °F (37.1 °C)] 98.5 °F (36.9 °C)  Pulse:  [46-75] 64  Resp:  [18] 18  SpO2:  [95 %-99 %] 97 %  BP: (122-201)/(62-90) 129/62     Weight: 74.8 kg (165 lb)  Body mass index is 23.01 kg/(m^2).    Intake/Output Summary (Last 24 hours) at 03/02/17 1512  Last data filed at 03/02/17 1211   Gross per 24 hour   Intake              360 ml   Output              550 ml   Net             -190 ml      Physical Exam   Constitutional: He is oriented to person, place, and time. He appears well-developed and well-nourished. He appears distressed (moderate distress).   HENT:   Head: Normocephalic and atraumatic.   Nose: Nose normal.   Mouth/Throat: Oropharynx is clear and moist.   Eyes: Pupils are equal, round, and reactive to light.   Cardiovascular: Normal rate, regular rhythm, normal heart sounds and intact distal pulses.    Pulmonary/Chest: Effort normal and breath sounds normal.   Abdominal: Soft. Bowel sounds are normal. There is tenderness. There is guarding. There is no  rebound.   Genitourinary: Rectum normal.   Genitourinary Comments: Digital rectal exam shows soft, brown, guaiac negative stool.   Musculoskeletal: Normal range of motion. He exhibits no edema.   Neurological: He is alert and oriented to person, place, and time.   Reflexes 2+ bilateral biceps, 2+ bilateral patellar.   Skin: Skin is warm and dry. No rash noted.   Vitals reviewed.    Significant Labs:   BMP:   Recent Labs  Lab 03/02/17  0831   GLU 99   *   K 4.1      CO2 25   BUN 11   CREATININE 1.0   CALCIUM 10.8*   MG 2.2     CBC:   Recent Labs  Lab 03/01/17  1204 03/02/17  0831   WBC 18.50* 12.34   HGB 15.9 14.7   HCT 48.4 43.7   * 293     Significant Imaging: I have reviewed and interpreted all pertinent imaging results/findings within the past 24 hours.

## 2017-03-02 NOTE — H&P
"[facility] Medicine  History & Physical    Patient Name: Ha Li  MRN: [id number]  Admission Date: 3/1/2017  Attending Physician: Randolph Saucedo MD   Primary Care Provider: Primary Doctor [facility] Medicine Team: [facility] 3 D Fazal Suarez MD     Patient information was obtained from patient and ER records.     Subjective:     Principal Problem:Hypercalcemia    Chief Complaint: Nausea and vomiting     HPI: Mr. Li is a 51/M with PMH GERD who presented as a transfer from [facility] with hypercalcemia, nausea and vomiting. He states that these issues have been constant over the past two months. He initially presented to a hospital in [place], [place], where he resides for evaluation for this - he states that at that time, they "did all kinds of tests, CTs, everything, but they couldn't tell me anything." He has had intermittent control of his symptoms by taking Zantac; he denies having taken calcium carbonate tablets for acid reflux in the past. He states that his nausea and vomiting have worsened in the past with consumption of greasy foods. He attests to having had more to drink during [place] than normal; usually consumes ~4 40oz alcoholic beverages in a week. Attests to active smoking, 0.5PPD for 20 years. Denies psychiatric changes, bony pain, renal stones, constipation; complains of chronic indigestion, nausea and vomiting.    Past Medical History:   Diagnosis Date    GERD (gastroesophageal reflux disease)      History reviewed. No pertinent surgical history.    Review of patient's allergies indicates:  No Known Allergies    Current Facility-Administered Medications on File Prior to Encounter   Medication    [COMPLETED] (pyxis) gi cocktail (mylanta 30 mL, lidocaine 2 % viscous 10 mL, dicyclomine 10 mL) 50 mL    [COMPLETED] 0.9%  NaCl infusion    [COMPLETED] diphenhydrAMINE injection 12.5 mg    [COMPLETED] hydromorphone (PF) injection 0.25 mg    " [COMPLETED] hydromorphone (PF) injection 1 mg    [COMPLETED] omnipaque 350 iohexol 350 mg iodine/mL    [COMPLETED] ondansetron injection 4 mg    [COMPLETED] promethazine (PHENERGAN) 12.5 mg in dextrose 5 % 50 mL IVPB    [COMPLETED] promethazine (PHENERGAN) 12.5 mg in dextrose 5 % 50 mL IVPB    [COMPLETED] sodium chloride 0.9% bolus 1,000 mL    [DISCONTINUED] hydromorphone (PF) injection 0.5 mg    [DISCONTINUED] morphine injection 6 mg    [DISCONTINUED] promethazine (PHENERGAN) 12.5 mg in dextrose 5 % 50 mL IVPB    [DISCONTINUED] sodium chloride 0.9% bolus 1,000 mL     No current outpatient prescriptions on file prior to encounter.     Family History     None        Social History Main Topics    Smoking status: Current Every Day Smoker     Packs/day: 0.50     Years: 20.00     Types: Cigarettes    Smokeless tobacco: Never Used    Alcohol use Yes      Comment: 4 40oz a week    Drug use: Yes     Special: Marijuana    Sexual activity: Not on file     Review of Systems   Constitutional: Positive for chills, diaphoresis, fatigue and fever (Reports Tmax 103). Negative for unexpected weight change.   HENT: Positive for sore throat. Negative for trouble swallowing.    Eyes: Negative for pain and visual disturbance.   Respiratory: Positive for shortness of breath (with nausea/vomiting). Negative for cough.    Cardiovascular: Positive for palpitations. Negative for chest pain.   Gastrointestinal: Positive for abdominal pain, blood in stool (black stool), nausea and vomiting. Negative for constipation.   Genitourinary: Negative for difficulty urinating, dysuria and hematuria.   Musculoskeletal: Negative for arthralgias and myalgias.   Skin: Negative for rash and wound.   Neurological: Negative for light-headedness and numbness.     Objective:     Vital Signs (Most Recent):  Temp: 98.7 °F (37.1 °C) (03/01/17 2307)  Pulse: (!) 48 (03/01/17 2307)  Resp: 18 (03/01/17 2307)  BP: (!) 201/90 (03/01/17 2307)  SpO2: 97 %  (03/01/17 0117) Vital Signs (24h Range):  Temp:  [97.1 °F (36.2 °C)-98.7 °F (37.1 °C)] 98.7 °F (37.1 °C)  Pulse:  [46-74] 48  Resp:  [18-20] 18  SpO2:  [97 %-99 %] 97 %  BP: (146-201)/(71-90) 201/90     Weight: 74.8 kg (165 lb)  Body mass index is 23.01 kg/(m^2).    Physical Exam   Constitutional: He is oriented to person, place, and time. He appears well-developed and well-nourished. He appears distressed (moderate distress).   HENT:   Head: Normocephalic and atraumatic.   Nose: Nose normal.   Mouth/Throat: Oropharynx is clear and moist.   Eyes: Pupils are equal, round, and reactive to light.   Cardiovascular: Normal rate, regular rhythm, normal heart sounds and intact distal pulses.    Pulmonary/Chest: Effort normal and breath sounds normal.   Abdominal: Soft. Bowel sounds are normal. There is tenderness. There is guarding. There is no rebound.   Genitourinary: Rectum normal.   Genitourinary Comments: Digital rectal exam shows soft, brown, guaiac negative stool.   Musculoskeletal: Normal range of motion. He exhibits no edema.   Neurological: He is alert and oriented to person, place, and time.   Reflexes 2+ bilateral biceps, 2+ bilateral patellar.   Skin: Skin is warm and dry. No rash noted.   Vitals reviewed.    Significant Labs:   Recent Results (from the past 24 hour(s))   CBC W/ AUTO DIFFERENTIAL    Collection Time: 03/01/17 12:04 PM   Result Value Ref Range    WBC 18.50 (H) 3.90 - 12.70 K/uL    RBC 5.31 4.60 - 6.20 M/uL    Hemoglobin 15.9 14.0 - 18.0 g/dL    Hematocrit 48.4 40.0 - 54.0 %    MCV 91 82 - 98 fL    MCH 30.0 27.0 - 31.0 pg    MCHC 32.9 32.0 - 36.0 %    RDW 15.3 (H) 11.5 - 14.5 %    Platelets 359 (H) 150 - 350 K/uL    MPV 8.4 (L) 9.2 - 12.9 fL    Gran% 76.0 (H) 38.0 - 73.0 %    Lymph% 18.0 18.0 - 48.0 %    Mono% 3.0 (L) 4.0 - 15.0 %    Eosinophil% 1.0 0.0 - 8.0 %    Basophil% 1.0 0.0 - 1.9 %    Bands 1.0 %    Platelet Estimate Appears normal     Aniso Slight     Differential Method Manual    Comp.  Metabolic Panel    Collection Time: 03/01/17 12:04 PM   Result Value Ref Range    Sodium 137 136 - 145 mmol/L    Potassium 3.8 3.5 - 5.1 mmol/L    Chloride 97 95 - 110 mmol/L    CO2 21 (L) 23 - 29 mmol/L    Glucose 121 (H) 70 - 110 mg/dL    BUN, Bld 15 6 - 20 mg/dL    Creatinine 1.1 0.5 - 1.4 mg/dL    Calcium 12.2 (HH) 8.7 - 10.5 mg/dL    Total Protein 7.6 6.0 - 8.4 g/dL    Albumin 4.6 3.5 - 5.2 g/dL    Total Bilirubin 1.4 (H) 0.1 - 1.0 mg/dL    Alkaline Phosphatase 72 55 - 135 U/L    AST 23 10 - 40 U/L    ALT 21 10 - 44 U/L    Anion Gap 19 (H) 8 - 16 mmol/L    eGFR if African American >60 >60 mL/min/1.73 m^2    eGFR if non African American >60 >60 mL/min/1.73 m^2   Lipase    Collection Time: 03/01/17 12:04 PM   Result Value Ref Range    Lipase 12 4 - 60 U/L   Troponin I    Collection Time: 03/01/17 12:04 PM   Result Value Ref Range    Troponin I 0.009 0.000 - 0.026 ng/mL   Magnesium    Collection Time: 03/01/17 12:04 PM   Result Value Ref Range    Magnesium 2.2 1.6 - 2.6 mg/dL   TSH    Collection Time: 03/01/17 12:04 PM   Result Value Ref Range    TSH 0.438 0.400 - 4.000 uIU/mL   Cortisol    Collection Time: 03/01/17 12:04 PM   Result Value Ref Range    Cortisol 39.9 ug/dL   Influenza antigen Nasal Swab    Collection Time: 03/01/17 12:41 PM   Result Value Ref Range    Influenza A Ag, EIA Negative Negative    Influenza B Ag, EIA Negative Negative    Flu A & B Source Nasal Swab    Urinalysis    Collection Time: 03/01/17  5:04 PM   Result Value Ref Range    Specimen UA Urine, Clean Catch     Color, UA Yellow Yellow, Straw, Kitty    Appearance, UA Clear Clear    pH, UA 6.0 5.0 - 8.0    Specific Gravity, UA 1.015 1.005 - 1.030    Protein, UA 1+ (A) Negative    Glucose, UA Negative Negative    Ketones, UA 2+ (A) Negative    Bilirubin (UA) 1+ (A) Negative    Occult Blood UA Trace (A) Negative    Nitrite, UA Negative Negative    Urobilinogen, UA Negative <2.0 EU/dL    Leukocytes, UA Negative Negative   Urinalysis  Microscopic    Collection Time: 03/01/17  5:04 PM   Result Value Ref Range    RBC, UA 1 0 - 4 /hpf    WBC, UA 0 0 - 5 /hpf    Bacteria, UA Rare None-Occ /hpf    Squam Epithel, UA 1 /hpf    Hyaline Casts, UA 0 0-1/lpf /lpf    Microscopic Comment SEE COMMENT      Significant Imaging:   CXR 03/01/17:  No acute process.    KUB 03/01/17:  No acute abdominal process.    CT Abdomen Pelvis W Contrast 03/01/17:  No acute CT findings in the abdomen or pelvis to explain this patient's symptoms. Mild hepatic steatosis. Moderate prostatomegaly. Low-density masses in the upper poles of each kidney which are too small to characterize.  These most likely represent small cysts but nonemergent renal ultrasound is recommended particularly for the right upper pole lesion for further evaluation.  The left upper pole lesion may be too small to visualize sonographically.    Assessment/Plan:     * Hypercalcemia  - Differential is broad, including primary hyperparathyroidism, malignancy, FHH, MEN, medication induced, etc.  - Denies any family history of issues with calcium or parathyroids, malignancy. Denies taking medications besides Zantac.  - Denies nephrolithiasis, bone pain, psychiatric/neurologic changes; is bradycardic, has nausea/vomiting, abdominal pain.  - Will start workup with repeat calcium, ionized calcium, PTH. If confirmed elevated and PTH low, would check PTHrP, 1,25-OH vit D, 25-OH vit D levels.  - Will consult endocrinology for further evaluation.    Total bilirubin, elevated  - Mildly elevated total bilirubin.  - No significant LFT elevation; CT demonstrates mild hepatic steatosis.  - Will continue to monitor.    Leukocytosis  - Unclear etiology at this time; 76% neutrophils, 1% bands.  - Potentially secondary to infectious process; considering gastroenteritis, but given severity of symptoms lower suspicion for this alone.  - CT Abdomen/Pelvis W Contrast unremarkable for acute intra-abdominal infection.  - SIRS 1/4 on  admission (leukocytosis).  - Will defer antibiotic therapy at this time; if febrile, will start broad-spectrum coverage.    Bilateral renal cysts  - As noted on CT scan.  - Will order US Retroperitoneal to further evaluate.    Nausea and vomiting  - Nausea, vomiting likely in the setting of hypercalcemia.  - Will order ondansetron 8mg PO q8hr PRN, 4mg IV q8hr PRN for control.  - Oxycodone 5mg PO q6hr PRN for symptomatic pain control.    GERD (gastroesophageal reflux disease)  - Patient takes ranitidine PO as needed at home.    Essential hypertension  - BP severely elevated at 180s, 200s SBP. When pain controlled, SBP 140s.  - Will start lisinopril 10mg PO daily.    Tobacco abuse  - Will start nicotine 14mg transdermal daily.  - Tobacco cessation counseling with respiratory.    VTE Risk Mitigation         Ordered     enoxaparin injection 40 mg  Daily     Route:  Subcutaneous        03/01/17 2312     Medium Risk of VTE  Once      03/01/17 2312        Staff attestation to follow.    SERVANDO Enriquez MD   PGY-2   [phone]

## 2017-03-02 NOTE — NURSING
Pt arrived from [facility] facility via ambulance with /90's, HR in the 40's, complaining of pain 10/10 and intermittent nausea. Pt is AAOx4, skin intact. Paged IM-3 who will come assess pt at bedside. Awaiting new orders and will continue to monitor the pt.

## 2017-03-02 NOTE — ASSESSMENT & PLAN NOTE
- Differential is broad, including primary hyperparathyroidism, malignancy, FHH, MEN, medication induced, etc.  - Denies any family history of issues with calcium or parathyroids, malignancy. Denies taking medications besides Zantac.  - Denies nephrolithiasis, bone pain, psychiatric/neurologic changes; is bradycardic, has nausea/vomiting, abdominal pain.  - Will start workup with repeat calcium, ionized calcium, PTH. If confirmed elevated and PTH low, would check PTHrP, 1,25-OH vit D, 25-OH vit D levels.  - Will consult endocrinology for further evaluation.

## 2017-03-02 NOTE — ASSESSMENT & PLAN NOTE
- Nausea, vomiting likely in the setting of hypercalcemia.  - Will order ondansetron 8mg PO q8hr PRN, 4mg IV q8hr PRN for control.  - Oxycodone 5mg PO q6hr PRN for symptomatic pain control.

## 2017-03-02 NOTE — ASSESSMENT & PLAN NOTE
- Unclear etiology at this time; 76% neutrophils, 1% bands.  - Potentially secondary to infectious process; considering gastroenteritis, but given severity of symptoms lower suspicion for this alone.  - CT Abdomen/Pelvis W Contrast unremarkable for acute intra-abdominal infection.  - SIRS 1/4 on admission (leukocytosis).  - Will defer antibiotic therapy at this time; if febrile, will start broad-spectrum coverage.

## 2017-03-02 NOTE — PROGRESS NOTES
"[facility] Hospital Medicine  Progress Note    Patient Name: Ha Li  MRN: [id number]  Patient Class: IP- Inpatient   Admission Date: 3/1/2017  Length of Stay: 1 days  Attending Physician: Randolph Saucedo MD  Primary Care Provider: Primary Doctor [facility] Medicine Team: [facility] HOSP MED 3 Trisha Lyn MD    Subjective:     Principal Problem:Hypercalcemia    HPI:  Mr. Li is a 51/M with PMH GERD who presented as a transfer from [facility] with hypercalcemia, nausea and vomiting. He states that these issues have been constant over the past two months. He initially presented to a hospital in [place], [place], where he resides for evaluation for this - he states that at that time, they "did all kinds of tests, CTs, everything, but they couldn't tell me anything." He has had intermittent control of his symptoms by taking Zantac; he denies having taken calcium carbonate tablets for acid reflux in the past. He states that his nausea and vomiting have worsened in the past with consumption of greasy foods. He attests to having had more to drink during [place] than normal; usually consumes ~4 40oz alcoholic beverages in a week. Attests to active smoking, 0.5PPD for 20 years. Denies psychiatric changes, bony pain, renal stones, constipation; complains of chronic indigestion, nausea and vomiting.    Hospital Course:       Interval History: NAEON. VS wnl. Admitted overnight for n & v.   Review of Systems   Constitutional: Positive for fatigue. Negative for unexpected weight change.   HENT: Negative for trouble swallowing.    Eyes: Negative for pain and visual disturbance.   Respiratory: Positive for shortness of breath (with nausea/vomiting). Negative for cough.    Cardiovascular: Negative for chest pain.   Gastrointestinal: Positive for abdominal pain, nausea and vomiting. Negative for constipation.   Genitourinary: Negative for difficulty urinating, dysuria and hematuria. "   Musculoskeletal: Negative for arthralgias and myalgias.   Skin: Negative for rash and wound.   Neurological: Negative for light-headedness and numbness.     Objective:     Vital Signs (Most Recent):  Temp: 98.5 °F (36.9 °C) (03/02/17 1210)  Pulse: 64 (03/02/17 1210)  Resp: 18 (03/02/17 1210)  BP: 129/62 (03/02/17 1210)  SpO2: 97 % (03/02/17 1210) Vital Signs (24h Range):  Temp:  [97.9 °F (36.6 °C)-98.7 °F (37.1 °C)] 98.5 °F (36.9 °C)  Pulse:  [46-75] 64  Resp:  [18] 18  SpO2:  [95 %-99 %] 97 %  BP: (122-201)/(62-90) 129/62     Weight: 74.8 kg (165 lb)  Body mass index is 23.01 kg/(m^2).    Intake/Output Summary (Last 24 hours) at 03/02/17 1512  Last data filed at 03/02/17 1211   Gross per 24 hour   Intake              360 ml   Output              550 ml   Net             -190 ml      Physical Exam   Constitutional: He is oriented to person, place, and time. He appears well-developed and well-nourished. He appears distressed (moderate distress).   HENT:   Head: Normocephalic and atraumatic.   Nose: Nose normal.   Mouth/Throat: Oropharynx is clear and moist.   Eyes: Pupils are equal, round, and reactive to light.   Cardiovascular: Normal rate, regular rhythm, normal heart sounds and intact distal pulses.    Pulmonary/Chest: Effort normal and breath sounds normal.   Abdominal: Soft. Bowel sounds are normal. There is tenderness. There is guarding. There is no rebound.   Genitourinary: Rectum normal.   Musculoskeletal: Normal range of motion. He exhibits no edema.   Neurological: He is alert and oriented to person, place, and time.   Skin: Skin is warm and dry. No rash noted.   Vitals reviewed.    Significant Labs:   BMP:   Recent Labs  Lab 03/02/17  0831   GLU 99   *   K 4.1      CO2 25   BUN 11   CREATININE 1.0   CALCIUM 10.8*   MG 2.2     CBC:   Recent Labs  Lab 03/01/17  1204 03/02/17  0831   WBC 18.50* 12.34   HGB 15.9 14.7   HCT 48.4 43.7   * 293     Significant Imaging: I have reviewed and  interpreted all pertinent imaging results/findings within the past 24 hours.    CT Abdomen/Pelvis  1.  No acute CT findings in the abdomen or pelvis to explain this patient's symptoms.  2.  Mild hepatic steatosis.  3.  Moderate prostatomegaly.  4.  Low-density masses in the upper poles of each kidney which are too small to characterize.  These most likely represent small cysts but nonemergent renal ultrasound is recommended particularly for the right upper pole lesion for further evaluation.  The left upper pole lesion may be too small to visualize sonographically.    US retroperitoneum:   1.  There is a 1.6 x 1.7 x 1.2 cm well-circumscribed anechoic structure in the superior pole of the right kidney, which coincides with findings on this recent CT examination.  This likely represents a simple renal cyst.  2.  No evidence of hydronephrosis bilaterally.  3.  Minimal elevation of renal resistive indices.    Assessment/Plan:      Intractable vomiting with nausea  Gastroesophageal reflux disease without esophagitis  - chronic for last 15 years; appears cyclical or with acute exacerbations.   - h/o gastritis noted on EGD & relieved with zantac  - this episode likely exacerbated by increased alcohol intake  - will order IV protonix with subsequent scheduled PO protonix around the clock  - IV phenergan q6h prn     Hypercalcemia  - elevated calcium on admission (10.9) but ionized calcium wnl  - elevated PTH  - differentials broad including primary hyperparathyroidism, malignancy, FHH, MEN, medication induced however given lack of symptoms,  and normal ionized calcium, and extensive workup at [facility] ([facility]) will defer further evaluation.    Leukocytosis  Prostatitis, acute  - present on exam  - prostate: tender to palpation  - starting antibiotics: ciprofloxacin 500mg BID   - although leukocytosis has improved, will continue to treat full course    Benign essential hypertension  - BP severely elevated at 180s, 200s  SBP likely due to pain and emesis  - started lisinopril 10mg PO daily.    Bilateral renal cysts  - As noted on CT scan.   - U/S retroperitoneum noted cysts    Tobacco abuse  - Will start nicotine 14mg transdermal daily.  - Tobacco cessation counseling with respiratory.    Abnormal liver enzymes  - Mildly elevated total bilirubin & LFT  - CT: mild hepatic steatosis  - levels normalized the next day  - Will continue to monitor.    VTE Risk Mitigation         Ordered     Medium Risk of VTE  Once      03/01/17 8752      DVT: early ambulation; no chemical DVT needed  Full code     Dispo: Pending improvement in n/v     Plan of care discussed with staff    Trisha Lyn MD  Department of Hospital Medicine   [facility]

## 2017-03-02 NOTE — PHARMACY MED REC
"[facility] Medication Reconciliation  Template    Patient was admitted on 3/1/2017 for Hypercalcemia.    Patient's prior to admission medication regimen was as follows:  Prescriptions Prior to Admission   Medication Sig Dispense Refill Last Dose    ranitidine (ZANTAC) 150 MG tablet Take 150 mg by mouth once daily.        Please add appropriate    SmartPhrase below:    Admission Medication Reconciliation - Pharmacy Consult Note    The home medication history was taken by Bre Fraser, pharmacy technician.  Based on information gathered and subsequent review by the clinical pharmacist, the items below may need attention.    You may go to "Admission" then "Reconcile Home Medications" tabs to review and/or act upon these items.      No issues noted with the medication reconciliation.      Please address this information as you see fit.  Feel free to contact us if you have any questions or require assistance.    Vandana Brian, Pharm.D  EXT [id number]  "

## 2017-03-03 VITALS
BODY MASS INDEX: 23.1 KG/M2 | HEIGHT: 71 IN | RESPIRATION RATE: 16 BRPM | WEIGHT: 165 LBS | OXYGEN SATURATION: 98 % | SYSTOLIC BLOOD PRESSURE: 110 MMHG | TEMPERATURE: 99 F | DIASTOLIC BLOOD PRESSURE: 66 MMHG | HEART RATE: 57 BPM

## 2017-03-03 PROBLEM — E86.0 DEHYDRATION: Status: RESOLVED | Noted: 2017-03-02 | Resolved: 2017-03-03

## 2017-03-03 PROBLEM — D72.829 LEUKOCYTOSIS: Status: RESOLVED | Noted: 2017-03-01 | Resolved: 2017-03-03

## 2017-03-03 PROBLEM — I10 BENIGN ESSENTIAL HYPERTENSION: Status: RESOLVED | Noted: 2017-03-01 | Resolved: 2017-03-03

## 2017-03-03 PROBLEM — E83.52 HYPERCALCEMIA: Status: RESOLVED | Noted: 2017-03-01 | Resolved: 2017-03-03

## 2017-03-03 PROBLEM — R74.8 ABNORMAL LIVER ENZYMES: Status: RESOLVED | Noted: 2017-03-01 | Resolved: 2017-03-03

## 2017-03-03 PROBLEM — K76.0 HEPATIC STEATOSIS: Status: RESOLVED | Noted: 2017-03-02 | Resolved: 2017-03-03

## 2017-03-03 LAB
1,25(OH)2D3 SERPL-MCNC: 141 PG/ML
ALBUMIN SERPL BCP-MCNC: 3.2 G/DL
ALP SERPL-CCNC: 51 U/L
ALT SERPL W/O P-5'-P-CCNC: 15 U/L
ANION GAP SERPL CALC-SCNC: 8 MMOL/L
AST SERPL-CCNC: 16 U/L
BASOPHILS # BLD AUTO: 0.03 K/UL
BASOPHILS NFR BLD: 0.4 %
BILIRUB SERPL-MCNC: 1.3 MG/DL
BUN SERPL-MCNC: 10 MG/DL
CALCIUM SERPL-MCNC: 10 MG/DL
CHLORIDE SERPL-SCNC: 102 MMOL/L
CO2 SERPL-SCNC: 28 MMOL/L
CREAT SERPL-MCNC: 1 MG/DL
DIFFERENTIAL METHOD: ABNORMAL
EOSINOPHIL # BLD AUTO: 0.1 K/UL
EOSINOPHIL NFR BLD: 0.7 %
ERYTHROCYTE [DISTWIDTH] IN BLOOD BY AUTOMATED COUNT: 14.8 %
EST. GFR  (AFRICAN AMERICAN): >60 ML/MIN/1.73 M^2
EST. GFR  (NON AFRICAN AMERICAN): >60 ML/MIN/1.73 M^2
GLUCOSE SERPL-MCNC: 86 MG/DL
HCT VFR BLD AUTO: 42.1 %
HGB BLD-MCNC: 13.7 G/DL
LYMPHOCYTES # BLD AUTO: 1.8 K/UL
LYMPHOCYTES NFR BLD: 25.2 %
MAGNESIUM SERPL-MCNC: 2.2 MG/DL
MCH RBC QN AUTO: 30.6 PG
MCHC RBC AUTO-ENTMCNC: 32.5 %
MCV RBC AUTO: 94 FL
MONOCYTES # BLD AUTO: 0.8 K/UL
MONOCYTES NFR BLD: 11.1 %
NEUTROPHILS # BLD AUTO: 4.5 K/UL
NEUTROPHILS NFR BLD: 62.5 %
PHOSPHATE SERPL-MCNC: 2.4 MG/DL
PLATELET # BLD AUTO: 262 K/UL
PMV BLD AUTO: 10.4 FL
POTASSIUM SERPL-SCNC: 3.6 MMOL/L
PROT SERPL-MCNC: 5.5 G/DL
RBC # BLD AUTO: 4.47 M/UL
SODIUM SERPL-SCNC: 138 MMOL/L
WBC # BLD AUTO: 7.27 K/UL

## 2017-03-03 PROCEDURE — 83735 ASSAY OF MAGNESIUM: CPT

## 2017-03-03 PROCEDURE — 25000003 PHARM REV CODE 250: Performed by: STUDENT IN AN ORGANIZED HEALTH CARE EDUCATION/TRAINING PROGRAM

## 2017-03-03 PROCEDURE — 80053 COMPREHEN METABOLIC PANEL: CPT

## 2017-03-03 PROCEDURE — 84100 ASSAY OF PHOSPHORUS: CPT

## 2017-03-03 PROCEDURE — 25000003 PHARM REV CODE 250: Performed by: INTERNAL MEDICINE

## 2017-03-03 PROCEDURE — 36415 COLL VENOUS BLD VENIPUNCTURE: CPT

## 2017-03-03 PROCEDURE — 99238 HOSP IP/OBS DSCHRG MGMT 30/<: CPT | Mod: ,,, | Performed by: INTERNAL MEDICINE

## 2017-03-03 PROCEDURE — 99900035 HC TECH TIME PER 15 MIN (STAT)

## 2017-03-03 PROCEDURE — 85025 COMPLETE CBC W/AUTO DIFF WBC: CPT

## 2017-03-03 PROCEDURE — 99406 BEHAV CHNG SMOKING 3-10 MIN: CPT

## 2017-03-03 RX ORDER — CIPROFLOXACIN 500 MG/1
500 TABLET ORAL EVERY 12 HOURS
Qty: 10 TABLET | Refills: 0 | Status: SHIPPED | OUTPATIENT
Start: 2017-03-03 | End: 2017-03-03

## 2017-03-03 RX ORDER — PANTOPRAZOLE SODIUM 40 MG/1
40 TABLET, DELAYED RELEASE ORAL DAILY
Qty: 30 TABLET | Refills: 2 | Status: SHIPPED | OUTPATIENT
Start: 2017-03-03 | End: 2017-04-02

## 2017-03-03 RX ORDER — CIPROFLOXACIN 500 MG/1
500 TABLET ORAL EVERY 12 HOURS
Qty: 26 TABLET | Refills: 0 | Status: SHIPPED | OUTPATIENT
Start: 2017-03-03 | End: 2017-03-16

## 2017-03-03 RX ORDER — IBUPROFEN 200 MG
1 TABLET ORAL DAILY
Refills: 0 | COMMUNITY
Start: 2017-03-03

## 2017-03-03 RX ORDER — PROMETHAZINE HYDROCHLORIDE 12.5 MG/1
12.5 TABLET ORAL EVERY 6 HOURS PRN
Qty: 30 TABLET | Refills: 1 | Status: SHIPPED | OUTPATIENT
Start: 2017-03-03 | End: 2017-05-02

## 2017-03-03 RX ORDER — TAMSULOSIN HYDROCHLORIDE 0.4 MG/1
0.4 CAPSULE ORAL DAILY
Qty: 30 CAPSULE | Refills: 2 | Status: SHIPPED | OUTPATIENT
Start: 2017-03-03 | End: 2018-03-03

## 2017-03-03 RX ADMIN — PANTOPRAZOLE SODIUM 40 MG: 40 TABLET, DELAYED RELEASE ORAL at 08:03

## 2017-03-03 RX ADMIN — NICOTINE 1 PATCH: 14 PATCH, EXTENDED RELEASE TRANSDERMAL at 08:03

## 2017-03-03 RX ADMIN — CIPROFLOXACIN HYDROCHLORIDE 500 MG: 500 TABLET, FILM COATED ORAL at 08:03

## 2017-03-03 RX ADMIN — PROMETHAZINE HYDROCHLORIDE 12.5 MG: 12.5 TABLET ORAL at 12:03

## 2017-03-03 RX ADMIN — TAMSULOSIN HYDROCHLORIDE 0.4 MG: 0.4 CAPSULE ORAL at 08:03

## 2017-03-03 RX ADMIN — ALUMINUM HYDROXIDE, MAGNESIUM HYDROXIDE, AND SIMETHICONE 50 ML: 200; 200; 20 SUSPENSION ORAL at 05:03

## 2017-03-03 RX ADMIN — LISINOPRIL 10 MG: 10 TABLET ORAL at 08:03

## 2017-03-03 RX ADMIN — PROMETHAZINE HYDROCHLORIDE 12.5 MG: 12.5 TABLET ORAL at 05:03

## 2017-03-03 NOTE — PROGRESS NOTES
Patient Consulted by CTTS:     The following was discussed by the Tobacco Treatment Specialist:  ? Relevance of Quitting  ? Risk to Health  ? Long Term Risk  ? Risk for Others  ? Rewards of Quitting  ? Motivation Intervention to Quit

## 2017-03-03 NOTE — SUBJECTIVE & OBJECTIVE
Interval History: NAEON. VS wnl. Nausea and vomiting improved. Able to tolerate clear liquids last night. Will advance diet today    Review of Systems   Constitutional: Negative for unexpected weight change.   HENT: Negative for trouble swallowing.    Cardiovascular: Negative for chest pain.   Gastrointestinal: Positive for abdominal pain and nausea. Negative for constipation.   Genitourinary: Negative for difficulty urinating, dysuria and hematuria.   Neurological: Negative for light-headedness and numbness.     Objective:     Vital Signs (Most Recent):  Temp: 98.5 °F (36.9 °C) (03/03/17 0726)  Pulse: (!) 57 (03/03/17 0726)  Resp: 16 (03/03/17 0726)  BP: 110/66 (03/03/17 0726)  SpO2: 98 % (03/03/17 0726) Vital Signs (24h Range):  Temp:  [98.1 °F (36.7 °C)-98.5 °F (36.9 °C)] 98.5 °F (36.9 °C)  Pulse:  [52-65] 57  Resp:  [16-18] 16  SpO2:  [95 %-98 %] 98 %  BP: (110-142)/(60-79) 110/66     Weight: 74.8 kg (165 lb)  Body mass index is 23.01 kg/(m^2).    Intake/Output Summary (Last 24 hours) at 03/03/17 0949  Last data filed at 03/03/17 0600   Gross per 24 hour   Intake              300 ml   Output             1900 ml   Net            -1600 ml      Physical Exam   Constitutional: He is oriented to person, place, and time. He appears well-developed and well-nourished. He appears distressed (moderate distress).   HENT:   Head: Normocephalic and atraumatic.   Nose: Nose normal.   Mouth/Throat: Oropharynx is clear and moist.   Eyes: Pupils are equal, round, and reactive to light.   Cardiovascular: Normal rate, regular rhythm, normal heart sounds and intact distal pulses.    No murmur heard.  Pulmonary/Chest: Effort normal and breath sounds normal.   Abdominal: Soft. Bowel sounds are normal. There is tenderness (improved). There is no rebound.   Musculoskeletal: Normal range of motion. He exhibits no edema.   Neurological: He is alert and oriented to person, place, and time.   Skin: Skin is warm and dry. No rash noted.    Vitals reviewed.    Significant Labs:   BMP:   Recent Labs  Lab 03/03/17  0447   GLU 86      K 3.6      CO2 28   BUN 10   CREATININE 1.0   CALCIUM 10.0   MG 2.2     CBC:   Recent Labs  Lab 03/01/17  1204 03/02/17  0831 03/03/17  0447   WBC 18.50* 12.34 7.27   HGB 15.9 14.7 13.7*   HCT 48.4 43.7 42.1   * 293 262     Significant Imaging: No imaging results/findings within the past 24 hours.

## 2017-03-03 NOTE — PLAN OF CARE
03/03/17 1124   Final Note   Assessment Type Final Discharge Note   Discharge Disposition Home   Discharge planning education complete? Yes   What phone number can be called within the next 1-3 days to see how you are doing after discharge? [phone]   Hospital Follow Up  Appt(s) scheduled? Yes  (PCC)   Discharge plans and expectations educations in teach back method with documentation complete? Yes   Offered [facility] -- Bedside Delivery? n/a   Discharge/Hospital Encounter Summary to ([facility]) PCP n/a   Referral to Outpatient Case Management complete? Yes   Referral to / orders for Home Health Complete? n/a   30 day supply of medicines given at discharge, if documented non-compliance / non-adherence? No   Any social issues identified prior to discharge? Yes  (no insurance)   Did you assess the readiness or willingness of the family or caregiver to support self management of care? n/a   Right Care Referral Info   Post Acute Recommendation No Care

## 2017-03-03 NOTE — DISCHARGE SUMMARY
"[facility] Hospital Medicine  Discharge Summary      Patient Name: Ha Li  MRN: [id number]  Admission Date: 3/1/2017  Hospital Length of Stay: 2 days  Discharge Date and Time: 3/3/2017 11:34 AM  Attending Physician: Randolph Saucedo MD  Discharging Provider: Trisha Lyn MD  Primary Care Provider: Primary Doctor [facility] Medicine Team: [facility] HOSP MED 3 Trisha Lyn MD    HPI:   Mr. Li is a 52 y/o M with PMH GERD who presented as a transfer from [facility] with hypercalcemia, nausea and vomiting.   His hypercalcemia has been present for the last two months. He initially presented to a hospital in [place], [place], where he resides for evaluation for this - he states that at that time, they "did all kinds of tests, CTs, everything, but they couldn't tell me anything."     He had a history of nausea and vomiting for last 15 years, has undergone EGD revealing gastritis. He takes zantac for relief. He states that his nausea and vomiting have worsened in the past with consumption of greasy foods. He attests to having had more to drink during [place] than normal; usually consumes ~4 40oz alcoholic beverages in a week. Attests to active smoking, 0.5PPD for 20 years. Denies psychiatric changes, bony pain, renal stones, constipation; complains of chronic indigestion, nausea and vomiting.    Hospital Course:   Patient admitted for severe nausea/vomitting exacerbated by alcohol use in setting of chronic Gastritis/GERG. The acute exacerbation was attributed to increased alcohol intake on a background of chronic GERD.   During the admission, he was given IV protonix, subsequent PO protonix & PO phenergan to take as needed for nausea. He was given IVF for dehydration. The next day, he felt better and was able to tolerate diet without further nausea or emesis. He will be discharged on refills for phenergan to take at home.      The hypercalcemia on admission (10.9) was associated with " elevated PTH, but ionized calcium was wnl. His vitamin D3 levels were wnl. Differentials broadly including primary hyperparathyroidism, malignancy, FHH, MEN, medication induced however given lack of symptoms, and normal ionized calcium, and extensive workup at [facility] ([facility]) will defer further evaluation while inpatient. He may be referred outpatient to Endocrine for further evaluation of hypercalcemia.    A CT abdomen noted prostatomegaly and patient noted dysuria and burning despite having clear urine. We started antibiotics (ciprofloxacin 500mg BID) and will contnue tof r2 weeks total to treat for acute on chronic complicated urinary tract prostatitis. Last date of antibiotics: 3/16/17    He was initially given lisinopril 10mg while admitted to hospital but hypertension resolved the next day concurrently with the emesis and so patient was note sent home on lisinopril.     For active tobacco use, he was given nicotine patch 14mcg and tobacco cessation provided.   For abnormal liver enzymes, he had noted hepatic steatosis on CT and liver enzymes normalized after IV hydration.   For bilateral renal masses noted on CT in renal pole, US of retroperitoneum was done and noted them to be cysts. No acute issues     He was given a follow up appointment in [facility] priority care clinic upon discharge    Consults: n/a    Significant Diagnostic Studies: Labs:   BMP:   Recent Labs  Lab 03/02/17  0028 03/02/17  0831 03/03/17  0447   * 99 86    135* 138   K 4.1 4.1 3.6    100 102   CO2 25 25 28   BUN 12 11 10   CREATININE 0.9 1.0 1.0   CALCIUM 10.9* 10.8* 10.0   MG  --  2.2 2.2    and CBC   Recent Labs  Lab 03/02/17  0831 03/03/17  0447   WBC 12.34 7.27   HGB 14.7 13.7*   HCT 43.7 42.1    262     Calcium: 10.9  Ionized calcium: 1.34  PTH: 78   Vit D2: <8  Vit D3: 141    CT Abdomen:   1.  No acute CT findings in the abdomen or pelvis to explain this patient's symptoms.  2.  Mild hepatic steatosis.  3.   Moderate prostatomegaly.  4.  Low-density masses in the upper poles of each kidney which are too small to characterize.  These most likely represent small cysts but nonemergent renal ultrasound is recommended particularly for the right upper pole lesion for further evaluation.  The left upper pole lesion may be too small to visualize sonographically.      Pending Diagnostic Studies:     Procedure Component Value Units Date/Time    PTH-related peptide [[id number]] Collected:  03/02/17 0831    Order Status:  Sent Lab Status:  In process Updated:  03/02/17 0842    Specimen:  Blood from Blood         Final Active Diagnoses:    Diagnosis Date Noted POA    PRINCIPAL PROBLEM:  Intractable vomiting with nausea [R11.2] 03/01/2017 Yes    Gastroesophageal reflux disease without esophagitis [K21.9] 03/01/2017 Yes    Acute prostatitis [N41.0] 03/02/2017 Yes    Leukocytosis [D72.829] 03/01/2017 Yes    Hypercalcemia [E83.52] 03/01/2017 Yes    Benign essential hypertension [I10] 03/01/2017 Yes    Bilateral renal cysts [N28.1] 03/01/2017 Not Applicable    Tobacco abuse [Z72.0] 03/02/2017 Yes    Hepatic steatosis [K76.0] 03/02/2017 Yes    Abnormal liver enzymes [R74.8] 03/01/2017 Yes    Dehydration [E86.0] 03/02/2017 Yes      Problems Resolved During this Admission:    Diagnosis Date Noted Date Resolved POA      Discharged Condition: good    Disposition: Home or Self Care    Follow Up:  Follow-up Information     Follow up with [facility] On 3/9/2017.    Specialty:  Priority Care    Why:  11:00am - bring a copy of your discharge paper work    Contact information:    [address]  [facility] [address] [phone]    Additional information:    [facility]        Patient Instructions:     Ambulatory referral to Smoking Cessation Program   Referral Priority: Routine Referral Type: Consultation   Referral Reason: Specialty Services Required     Requested Specialty: CTTS    Number of Visits Requested: 1      Diet general     Activity as tolerated     Call MD for:  temperature >100.4     Call MD for:  persistent nausea and vomiting or diarrhea     Call MD for:  severe uncontrolled pain     Call MD for:  redness, tenderness, or signs of infection (pain, swelling, redness, odor or green/yellow discharge around incision site)     Call MD for:  severe persistent headache     Call MD for:  difficulty breathing or increased cough     Call MD for:  worsening rash     Call MD for:  persistent dizziness, light-headedness, or visual disturbances     Call MD for:  increased confusion or weakness       Medications:  Reconciled Home Medications:   Discharge Medication List as of 3/3/2017 11:21 AM      START taking these medications    Details   nicotine (NICODERM CQ) 14 mg/24 hr Place 1 patch onto the skin once daily., Starting 3/3/2017, Until Discontinued, OTC      pantoprazole (PROTONIX) 40 MG tablet Take 1 tablet (40 mg total) by mouth once daily., Starting 3/3/2017, Until Sun 4/2/17, Normal      promethazine (PHENERGAN) 12.5 MG Tab Take 1 tablet (12.5 mg total) by mouth every 6 (six) hours as needed., Starting 3/3/2017, Until Tue 5/2/17, Normal      tamsulosin (FLOMAX) 0.4 mg Cp24 Take 1 capsule (0.4 mg total) by mouth once daily., Starting 3/3/2017, Until Sat 3/3/18, Normal         CONTINUE these medications which have CHANGED    Details   ciprofloxacin HCl (CIPRO) 500 MG tablet Take 1 tablet (500 mg total) by mouth every 12 (twelve) hours., Starting 3/3/2017, Until Thu 3/16/17, Normal         STOP taking these medications       ranitidine (ZANTAC) 150 MG tablet Comments:   Reason for Stopping:             Time spent on the discharge of patient: 30 minutes    Trisha Lyn MD  Department of Hospital Medicine  [facility]

## 2017-03-03 NOTE — PROGRESS NOTES
"[facility] Hospital Medicine  Progress Note    Patient Name: Ha Li  MRN: [id number]  Patient Class: IP- Inpatient   Admission Date: 3/1/2017  Length of Stay: 2 days  Attending Physician: Randolph Saucedo MD  Primary Care Provider: [facility] Medicine Team: [facility] 3 Trisha Lyn MD    Subjective:     Principal Problem:Intractable vomiting with nausea    HPI:  Mr. Li is a 51/M with PMH GERD who presented as a transfer from [facility] with hypercalcemia, nausea and vomiting. He states that these issues have been constant over the past two months. He initially presented to a hospital in [place], [place], where he resides for evaluation for this - he states that at that time, they "did all kinds of tests, CTs, everything, but they couldn't tell me anything." He has had intermittent control of his symptoms by taking Zantac; he denies having taken calcium carbonate tablets for acid reflux in the past. He states that his nausea and vomiting have worsened in the past with consumption of greasy foods. He attests to having had more to drink during [place] than normal; usually consumes ~4 40oz alcoholic beverages in a week. Attests to active smoking, 0.5PPD for 20 years. Denies psychiatric changes, bony pain, renal stones, constipation; complains of chronic indigestion, nausea and vomiting.    Hospital Course:    Patient admitted for severe nausea/vomitting exacerbated by alcohol use in setting of chronic  Gastritis/GERG.   Given IV protonix, subsequent PO protonix & PO phenergan to take as needed for nausea    Interval History: NAEON. VS wnl. Nausea and vomiting improved. Able to tolerate clear liquids last night. Will advance diet today    Review of Systems   Constitutional: Negative for unexpected weight change.   HENT: Negative for trouble swallowing.    Cardiovascular: Negative for chest pain.   Gastrointestinal: Positive for abdominal pain and nausea. " Negative for constipation.   Genitourinary: Negative for difficulty urinating, dysuria and hematuria.   Neurological: Negative for light-headedness and numbness.     Objective:     Vital Signs (Most Recent):  Temp: 98.5 °F (36.9 °C) (03/03/17 0726)  Pulse: (!) 57 (03/03/17 0726)  Resp: 16 (03/03/17 0726)  BP: 110/66 (03/03/17 0726)  SpO2: 98 % (03/03/17 0726) Vital Signs (24h Range):  Temp:  [98.1 °F (36.7 °C)-98.5 °F (36.9 °C)] 98.5 °F (36.9 °C)  Pulse:  [52-65] 57  Resp:  [16-18] 16  SpO2:  [95 %-98 %] 98 %  BP: (110-142)/(60-79) 110/66     Weight: 74.8 kg (165 lb)  Body mass index is 23.01 kg/(m^2).    Intake/Output Summary (Last 24 hours) at 03/03/17 0949  Last data filed at 03/03/17 0600   Gross per 24 hour   Intake              300 ml   Output             1900 ml   Net            -1600 ml      Physical Exam   Constitutional: He is oriented to person, place, and time. He appears well-developed and well-nourished. He appears distressed (moderate distress).   HENT:   Head: Normocephalic and atraumatic.   Nose: Nose normal.   Mouth/Throat: Oropharynx is clear and moist.   Eyes: Pupils are equal, round, and reactive to light.   Cardiovascular: Normal rate, regular rhythm, normal heart sounds and intact distal pulses.    No murmur heard.  Pulmonary/Chest: Effort normal and breath sounds normal.   Abdominal: Soft. Bowel sounds are normal. There is tenderness (improved). There is no rebound.   Musculoskeletal: Normal range of motion. He exhibits no edema.   Neurological: He is alert and oriented to person, place, and time.   Skin: Skin is warm and dry. No rash noted.   Vitals reviewed.    Significant Labs:   BMP:   Recent Labs  Lab 03/03/17  0447   GLU 86      K 3.6      CO2 28   BUN 10   CREATININE 1.0   CALCIUM 10.0   MG 2.2     CBC:   Recent Labs  Lab 03/01/17  1204 03/02/17  0831 03/03/17  0447   WBC 18.50* 12.34 7.27   HGB 15.9 14.7 13.7*   HCT 48.4 43.7 42.1   * 293 262     Significant  Imaging: No imaging results/findings within the past 24 hours.    Assessment/Plan:      Intractable vomiting with nausea  Gastroesophageal reflux disease without esophagitis  - chronic for last 15 years; appears cyclical or with acute exacerbations.   - h/o gastritis noted on EGD & relieved with zantac  - this episode likely exacerbated by increased alcohol intake  - will order IV protonix with subsequent scheduled PO protonix around the clock  - PO phenergan q6h prn has worked well in last 24 hours.  Will send home with PO phenergan as needed for nausea     Hypercalcemia  - elevated calcium on admission (10.9), elevated PTH, but ionized calcium wnl  - differentials broad including primary hyperparathyroidism, malignancy, FHH, MEN, medication induced however given lack of symptoms, and normal ionized calcium, and extensive workup at OSH ([facility]) will defer further evaluation.     Leukocytosis  Prostatitis, chronic  - h/o of chronic prostatis and prostatomegaly; patient has dysuria + clear urine  - starting antibiotics: ciprofloxacin 500mg BID x2 weeks (last date: 3/16/17)  - although leukocytosis has improved, will continue to treat full course     Benign essential hypertension  - BP severely elevated at 180s, 200s SBP likely due to pain and emesis  - started lisinopril 10mg PO daily but BP has trended down to <140/90. Patient will not need to go home on lisinopril     Bilateral renal cysts  - As noted on CT scan.   - U/S retroperitoneum noted cysts -> nothing further     Tobacco abuse  - Will start nicotine 14mg transdermal daily and OTC patch upon discharge  - Tobacco cessation counseling with respiratory.     Abnormal liver enzymes  - Mildly elevated total bilirubin & LFT  - CT: mild hepatic steatosis  - levels normalized the next day  - Will continue to monitor.      VTE Risk Mitigation         Ordered     Medium Risk of VTE  Once      03/01/17 1487      Diet: advancing to adult regular, as  tolerated  Full code    Dispo: plan to discharge today    Plan of care discussed with staff    Trisha Lyn MD  Department of Hospital Medicine   [facility]

## 2017-03-03 NOTE — PROGRESS NOTES
Discharge instruction given. Pt verbalized understanding of instruction given.  Iv removed. Informed pt of prescription at [facility].  Pt ok to leave without transport assistance

## 2017-03-03 NOTE — PLAN OF CARE
"Patient has a home in [place] and his inherited home from his parents in [place], but no health insurance. Patient states that "I pay my bills" but wants medication assistance until he can get back to [place]. Informed patient that cm will send info to pharmacy assistance program but will probably just need OTC meds. Will follow.     03/02/17 1040   Discharge Assessment   Assessment Type Discharge Planning Assessment   Confirmed/corrected address and phone number on facesheet? Yes   Assessment information obtained from? Patient;Medical Record   Expected Length of Stay (days) 3   Communicated expected length of stay with patient/caregiver yes   Prior to hospitilization cognitive status: Alert/Oriented   Prior to hospitalization functional status: Independent   Current cognitive status: Alert/Oriented   Current Functional Status: Independent   Arrived From home or self-care   Lives With alone   Able to Return to Prior Arrangements yes   Is patient able to care for self after discharge? Yes   Patient's perception of discharge disposition home or selfcare   Readmission Within The Last 30 Days no previous admission in last 30 days   Patient currently being followed by outpatient case management? No   Patient currently receives home health services? No   Does the patient currently use HME? No   Patient currently receives private duty nursing? No   Patient currently receives any other outside agency services? No   Equipment Currently Used at Home none   Do you have any problems affording any of your prescribed medications? TBD   Is the patient taking medications as prescribed? (no prescribed home meds)   Do you have any financial concerns preventing you from receiving the healthcare you need? Yes  (no insuance but I pay my bills)   Does the patient have transportation to healthcare appointments? Yes   Transportation Available car   On Dialysis? No   Does the patient receive services at the Coumadin Clinic? No   Are there any " open cases? No   Discharge Plan A Home   Patient/Family In Agreement With Plan yes

## 2017-03-07 LAB — PTH RELATED PROT SERPL-SCNC: 0.4 PMOL/L

## 2024-08-07 ENCOUNTER — HOSPITAL ENCOUNTER (INPATIENT)
Facility: OTHER | Age: 59
LOS: 2 days | Discharge: HOME | DRG: 683 | End: 2024-08-09
Attending: EMERGENCY MEDICINE | Admitting: HOSPITALIST
Payer: MEDICAID

## 2024-08-07 DIAGNOSIS — N17.9 AKI (ACUTE KIDNEY INJURY): Primary | ICD-10-CM

## 2024-08-07 DIAGNOSIS — E83.52 HYPERCALCEMIA: ICD-10-CM

## 2024-08-07 DIAGNOSIS — Z78.9 ALCOHOL USE: ICD-10-CM

## 2024-08-07 DIAGNOSIS — K21.9 GASTROESOPHAGEAL REFLUX DISEASE, UNSPECIFIED WHETHER ESOPHAGITIS PRESENT: ICD-10-CM

## 2024-08-07 DIAGNOSIS — E86.0 DEHYDRATION: ICD-10-CM

## 2024-08-07 PROBLEM — E87.29 METABOLIC ACIDOSIS, INCREASED ANION GAP (IAG): Status: ACTIVE | Noted: 2024-08-07

## 2024-08-07 PROBLEM — K92.1 BLOOD IN STOOL: Status: ACTIVE | Noted: 2024-08-07

## 2024-08-07 PROBLEM — I10 PRIMARY HYPERTENSION: Status: ACTIVE | Noted: 2024-08-07

## 2024-08-07 PROBLEM — E86.9 VOLUME DEPLETION, GASTROINTESTINAL LOSS: Status: ACTIVE | Noted: 2024-08-07

## 2024-08-07 LAB
25(OH)D3+25(OH)D2 SERPL-MCNC: 23 NG/ML (ref 30–96)
ALBUMIN SERPL BCP-MCNC: 3.5 G/DL (ref 3.5–5.2)
ALBUMIN SERPL BCP-MCNC: 5.3 G/DL (ref 3.5–5.2)
ALP SERPL-CCNC: 90 U/L (ref 55–135)
ALT SERPL W/O P-5'-P-CCNC: 12 U/L (ref 10–44)
ANION GAP SERPL CALC-SCNC: 20 MMOL/L (ref 8–16)
ANION GAP SERPL CALC-SCNC: 8 MMOL/L (ref 8–16)
AST SERPL-CCNC: 17 U/L (ref 10–40)
BASOPHILS # BLD AUTO: 0.06 K/UL (ref 0–0.2)
BASOPHILS NFR BLD: 0.4 % (ref 0–1.9)
BILIRUB SERPL-MCNC: 1.4 MG/DL (ref 0.1–1)
BUN SERPL-MCNC: 36 MG/DL (ref 6–20)
BUN SERPL-MCNC: 61 MG/DL (ref 6–20)
CALCIUM SERPL-MCNC: 10.3 MG/DL (ref 8.7–10.5)
CALCIUM SERPL-MCNC: 13 MG/DL (ref 8.7–10.5)
CHLORIDE SERPL-SCNC: 104 MMOL/L (ref 95–110)
CHLORIDE SERPL-SCNC: 93 MMOL/L (ref 95–110)
CHLORIDE UR-SCNC: <20 MMOL/L (ref 25–200)
CO2 SERPL-SCNC: 16 MMOL/L (ref 23–29)
CO2 SERPL-SCNC: 22 MMOL/L (ref 23–29)
COMPLEXED PSA SERPL-MCNC: 1.5 NG/ML (ref 0–4)
CREAT SERPL-MCNC: 1.3 MG/DL (ref 0.5–1.4)
CREAT SERPL-MCNC: 4.4 MG/DL (ref 0.5–1.4)
CREAT UR-MCNC: 156.9 MG/DL (ref 23–375)
DIFFERENTIAL METHOD BLD: ABNORMAL
EOSINOPHIL # BLD AUTO: 0 K/UL (ref 0–0.5)
EOSINOPHIL NFR BLD: 0.2 % (ref 0–8)
EOSINOPHIL URNS QL WRIGHT STN: NORMAL
ERYTHROCYTE [DISTWIDTH] IN BLOOD BY AUTOMATED COUNT: 13.8 % (ref 11.5–14.5)
EST. GFR  (NO RACE VARIABLE): 15 ML/MIN/1.73 M^2
EST. GFR  (NO RACE VARIABLE): >60 ML/MIN/1.73 M^2
GLUCOSE SERPL-MCNC: 113 MG/DL (ref 70–110)
GLUCOSE SERPL-MCNC: 89 MG/DL (ref 70–110)
HCT VFR BLD AUTO: 51.3 % (ref 40–54)
HGB BLD-MCNC: 17.5 G/DL (ref 14–18)
IMM GRANULOCYTES # BLD AUTO: 0.06 K/UL (ref 0–0.04)
IMM GRANULOCYTES NFR BLD AUTO: 0.4 % (ref 0–0.5)
INR PPP: 1 (ref 0.8–1.2)
LIPASE SERPL-CCNC: 39 U/L (ref 4–60)
LYMPHOCYTES # BLD AUTO: 3.4 K/UL (ref 1–4.8)
LYMPHOCYTES NFR BLD: 20.8 % (ref 18–48)
MCH RBC QN AUTO: 28.3 PG (ref 27–31)
MCHC RBC AUTO-ENTMCNC: 34.1 G/DL (ref 32–36)
MCV RBC AUTO: 83 FL (ref 82–98)
MONOCYTES # BLD AUTO: 1.8 K/UL (ref 0.3–1)
MONOCYTES NFR BLD: 10.8 % (ref 4–15)
NEUTROPHILS # BLD AUTO: 11.1 K/UL (ref 1.8–7.7)
NEUTROPHILS NFR BLD: 67.4 % (ref 38–73)
NRBC BLD-RTO: 0 /100 WBC
OSMOLALITY UR: 605 MOSM/KG (ref 50–1200)
PHOSPHATE SERPL-MCNC: 2.4 MG/DL (ref 2.7–4.5)
PLATELET # BLD AUTO: 423 K/UL (ref 150–450)
PMV BLD AUTO: 10.8 FL (ref 9.2–12.9)
POTASSIUM SERPL-SCNC: 4.1 MMOL/L (ref 3.5–5.1)
POTASSIUM SERPL-SCNC: 4.3 MMOL/L (ref 3.5–5.1)
PROT SERPL-MCNC: 9.6 G/DL (ref 6–8.4)
PROTHROMBIN TIME: 10.9 SEC (ref 9–12.5)
PTH-INTACT SERPL-MCNC: 108.5 PG/ML (ref 9–77)
RBC # BLD AUTO: 6.19 M/UL (ref 4.6–6.2)
SODIUM SERPL-SCNC: 129 MMOL/L (ref 136–145)
SODIUM SERPL-SCNC: 134 MMOL/L (ref 136–145)
SODIUM UR-SCNC: 29 MMOL/L (ref 20–250)
URATE SERPL-MCNC: 9.3 MG/DL (ref 3.4–7)
WBC # BLD AUTO: 16.45 K/UL (ref 3.9–12.7)

## 2024-08-07 PROCEDURE — 99285 EMERGENCY DEPT VISIT HI MDM: CPT | Mod: 25

## 2024-08-07 PROCEDURE — 83935 ASSAY OF URINE OSMOLALITY: CPT | Performed by: NURSE PRACTITIONER

## 2024-08-07 PROCEDURE — 84300 ASSAY OF URINE SODIUM: CPT | Performed by: NURSE PRACTITIONER

## 2024-08-07 PROCEDURE — 25000003 PHARM REV CODE 250: Performed by: EMERGENCY MEDICINE

## 2024-08-07 PROCEDURE — 85610 PROTHROMBIN TIME: CPT | Performed by: EMERGENCY MEDICINE

## 2024-08-07 PROCEDURE — 82570 ASSAY OF URINE CREATININE: CPT | Performed by: NURSE PRACTITIONER

## 2024-08-07 PROCEDURE — 63600175 PHARM REV CODE 636 W HCPCS: Performed by: HOSPITALIST

## 2024-08-07 PROCEDURE — 84153 ASSAY OF PSA TOTAL: CPT | Performed by: HOSPITALIST

## 2024-08-07 PROCEDURE — 63600175 PHARM REV CODE 636 W HCPCS: Performed by: NURSE PRACTITIONER

## 2024-08-07 PROCEDURE — 63600175 PHARM REV CODE 636 W HCPCS: Performed by: EMERGENCY MEDICINE

## 2024-08-07 PROCEDURE — 80069 RENAL FUNCTION PANEL: CPT | Performed by: HOSPITALIST

## 2024-08-07 PROCEDURE — 96375 TX/PRO/DX INJ NEW DRUG ADDON: CPT

## 2024-08-07 PROCEDURE — 83521 IG LIGHT CHAINS FREE EACH: CPT | Mod: 59 | Performed by: HOSPITALIST

## 2024-08-07 PROCEDURE — 96374 THER/PROPH/DIAG INJ IV PUSH: CPT

## 2024-08-07 PROCEDURE — 36415 COLL VENOUS BLD VENIPUNCTURE: CPT | Performed by: NURSE PRACTITIONER

## 2024-08-07 PROCEDURE — 82306 VITAMIN D 25 HYDROXY: CPT | Performed by: NURSE PRACTITIONER

## 2024-08-07 PROCEDURE — 83690 ASSAY OF LIPASE: CPT | Performed by: EMERGENCY MEDICINE

## 2024-08-07 PROCEDURE — 96361 HYDRATE IV INFUSION ADD-ON: CPT

## 2024-08-07 PROCEDURE — A4216 STERILE WATER/SALINE, 10 ML: HCPCS | Performed by: HOSPITALIST

## 2024-08-07 PROCEDURE — 25000003 PHARM REV CODE 250: Performed by: HOSPITALIST

## 2024-08-07 PROCEDURE — 25000003 PHARM REV CODE 250: Performed by: NURSE PRACTITIONER

## 2024-08-07 PROCEDURE — 85025 COMPLETE CBC W/AUTO DIFF WBC: CPT | Performed by: EMERGENCY MEDICINE

## 2024-08-07 PROCEDURE — 82436 ASSAY OF URINE CHLORIDE: CPT | Performed by: NURSE PRACTITIONER

## 2024-08-07 PROCEDURE — 87205 SMEAR GRAM STAIN: CPT | Performed by: NURSE PRACTITIONER

## 2024-08-07 PROCEDURE — 80053 COMPREHEN METABOLIC PANEL: CPT | Performed by: EMERGENCY MEDICINE

## 2024-08-07 PROCEDURE — 84550 ASSAY OF BLOOD/URIC ACID: CPT | Performed by: NURSE PRACTITIONER

## 2024-08-07 PROCEDURE — 36415 COLL VENOUS BLD VENIPUNCTURE: CPT | Performed by: HOSPITALIST

## 2024-08-07 PROCEDURE — 83970 ASSAY OF PARATHORMONE: CPT | Performed by: NURSE PRACTITIONER

## 2024-08-07 PROCEDURE — 21400001 HC TELEMETRY ROOM

## 2024-08-07 RX ORDER — NALOXONE HCL 0.4 MG/ML
0.02 VIAL (ML) INJECTION
Status: DISCONTINUED | OUTPATIENT
Start: 2024-08-07 | End: 2024-08-09 | Stop reason: HOSPADM

## 2024-08-07 RX ORDER — ALUMINUM HYDROXIDE, MAGNESIUM HYDROXIDE, AND SIMETHICONE 1200; 120; 1200 MG/30ML; MG/30ML; MG/30ML
30 SUSPENSION ORAL ONCE
Status: COMPLETED | OUTPATIENT
Start: 2024-08-07 | End: 2024-08-07

## 2024-08-07 RX ORDER — DIVALPROEX SODIUM 250 MG/1
250 TABLET, DELAYED RELEASE ORAL 2 TIMES DAILY
Status: DISCONTINUED | OUTPATIENT
Start: 2024-08-07 | End: 2024-08-09 | Stop reason: HOSPADM

## 2024-08-07 RX ORDER — AMLODIPINE BESYLATE 10 MG/1
10 TABLET ORAL DAILY
COMMUNITY
Start: 2024-07-05

## 2024-08-07 RX ORDER — HYDROMORPHONE HYDROCHLORIDE 1 MG/ML
1 INJECTION, SOLUTION INTRAMUSCULAR; INTRAVENOUS; SUBCUTANEOUS EVERY 4 HOURS PRN
Status: DISCONTINUED | OUTPATIENT
Start: 2024-08-07 | End: 2024-08-09 | Stop reason: HOSPADM

## 2024-08-07 RX ORDER — SODIUM CHLORIDE 0.9 % (FLUSH) 0.9 %
10 SYRINGE (ML) INJECTION EVERY 8 HOURS
Status: DISCONTINUED | OUTPATIENT
Start: 2024-08-07 | End: 2024-08-09 | Stop reason: HOSPADM

## 2024-08-07 RX ORDER — LIDOCAINE HYDROCHLORIDE 20 MG/ML
15 SOLUTION OROPHARYNGEAL ONCE
Status: COMPLETED | OUTPATIENT
Start: 2024-08-07 | End: 2024-08-07

## 2024-08-07 RX ORDER — PANTOPRAZOLE SODIUM 40 MG/10ML
40 INJECTION, POWDER, LYOPHILIZED, FOR SOLUTION INTRAVENOUS
Status: COMPLETED | OUTPATIENT
Start: 2024-08-07 | End: 2024-08-07

## 2024-08-07 RX ORDER — ONDANSETRON HYDROCHLORIDE 2 MG/ML
8 INJECTION, SOLUTION INTRAVENOUS EVERY 6 HOURS PRN
Status: DISCONTINUED | OUTPATIENT
Start: 2024-08-07 | End: 2024-08-09 | Stop reason: HOSPADM

## 2024-08-07 RX ORDER — SODIUM CHLORIDE 9 MG/ML
INJECTION, SOLUTION INTRAVENOUS CONTINUOUS
Status: DISCONTINUED | OUTPATIENT
Start: 2024-08-07 | End: 2024-08-08

## 2024-08-07 RX ORDER — TAMSULOSIN HYDROCHLORIDE 0.4 MG/1
0.4 CAPSULE ORAL DAILY
COMMUNITY

## 2024-08-07 RX ORDER — OMEPRAZOLE 40 MG/1
40 CAPSULE, DELAYED RELEASE ORAL EVERY MORNING
COMMUNITY
Start: 2024-06-07

## 2024-08-07 RX ORDER — SODIUM CHLORIDE 9 MG/ML
1000 INJECTION, SOLUTION INTRAVENOUS
Status: COMPLETED | OUTPATIENT
Start: 2024-08-07 | End: 2024-08-07

## 2024-08-07 RX ORDER — OXYCODONE HYDROCHLORIDE 10 MG/1
10 TABLET ORAL EVERY 4 HOURS PRN
Status: DISCONTINUED | OUTPATIENT
Start: 2024-08-07 | End: 2024-08-09 | Stop reason: HOSPADM

## 2024-08-07 RX ORDER — TAMSULOSIN HYDROCHLORIDE 0.4 MG/1
0.4 CAPSULE ORAL DAILY
Status: DISCONTINUED | OUTPATIENT
Start: 2024-08-07 | End: 2024-08-09 | Stop reason: HOSPADM

## 2024-08-07 RX ORDER — DROPERIDOL 2.5 MG/ML
1.25 INJECTION, SOLUTION INTRAMUSCULAR; INTRAVENOUS
Status: COMPLETED | OUTPATIENT
Start: 2024-08-07 | End: 2024-08-07

## 2024-08-07 RX ORDER — AMLODIPINE BESYLATE 5 MG/1
10 TABLET ORAL DAILY
Status: DISCONTINUED | OUTPATIENT
Start: 2024-08-07 | End: 2024-08-08

## 2024-08-07 RX ORDER — DIVALPROEX SODIUM 250 MG/1
250 TABLET, DELAYED RELEASE ORAL 2 TIMES DAILY
COMMUNITY
Start: 2024-07-29

## 2024-08-07 RX ORDER — ACETAMINOPHEN 325 MG/1
650 TABLET ORAL EVERY 4 HOURS PRN
Status: DISCONTINUED | OUTPATIENT
Start: 2024-08-07 | End: 2024-08-09 | Stop reason: HOSPADM

## 2024-08-07 RX ADMIN — DROPERIDOL 1.25 MG: 2.5 INJECTION, SOLUTION INTRAMUSCULAR; INTRAVENOUS at 04:08

## 2024-08-07 RX ADMIN — DIVALPROEX SODIUM 250 MG: 250 TABLET, DELAYED RELEASE ORAL at 10:08

## 2024-08-07 RX ADMIN — PANTOPRAZOLE SODIUM 40 MG: 40 INJECTION, POWDER, FOR SOLUTION INTRAVENOUS at 04:08

## 2024-08-07 RX ADMIN — HYDROMORPHONE HYDROCHLORIDE 1 MG: 1 INJECTION, SOLUTION INTRAMUSCULAR; INTRAVENOUS; SUBCUTANEOUS at 09:08

## 2024-08-07 RX ADMIN — TAMSULOSIN HYDROCHLORIDE 0.4 MG: 0.4 CAPSULE ORAL at 10:08

## 2024-08-07 RX ADMIN — SODIUM CHLORIDE, POTASSIUM CHLORIDE, SODIUM LACTATE AND CALCIUM CHLORIDE 1000 ML: 600; 310; 30; 20 INJECTION, SOLUTION INTRAVENOUS at 02:08

## 2024-08-07 RX ADMIN — SODIUM CHLORIDE 1000 ML: 9 INJECTION, SOLUTION INTRAVENOUS at 04:08

## 2024-08-07 RX ADMIN — SODIUM CHLORIDE: 9 INJECTION, SOLUTION INTRAVENOUS at 11:08

## 2024-08-07 RX ADMIN — DIVALPROEX SODIUM 250 MG: 250 TABLET, DELAYED RELEASE ORAL at 08:08

## 2024-08-07 RX ADMIN — ALUMINUM HYDROXIDE, MAGNESIUM HYDROXIDE, AND SIMETHICONE 30 ML: 200; 200; 20 SUSPENSION ORAL at 06:08

## 2024-08-07 RX ADMIN — ONDANSETRON 8 MG: 2 INJECTION INTRAMUSCULAR; INTRAVENOUS at 09:08

## 2024-08-07 RX ADMIN — SODIUM CHLORIDE: 9 INJECTION, SOLUTION INTRAVENOUS at 06:08

## 2024-08-07 RX ADMIN — Medication 10 ML: at 02:08

## 2024-08-07 RX ADMIN — SODIUM CHLORIDE 1000 ML: 9 INJECTION, SOLUTION INTRAVENOUS at 09:08

## 2024-08-07 RX ADMIN — Medication 10 ML: at 11:08

## 2024-08-07 RX ADMIN — AMLODIPINE BESYLATE 10 MG: 5 TABLET ORAL at 10:08

## 2024-08-07 RX ADMIN — LIDOCAINE HYDROCHLORIDE 15 ML: 20 SOLUTION ORAL at 06:08

## 2024-08-07 NOTE — SUBJECTIVE & OBJECTIVE
Past Medical History:   Diagnosis Date    Chronic prostatitis 3/2/2017    GERD without esophagitis     Hepatic steatosis 3/2/2017    Per CT scan       History reviewed. No pertinent surgical history.    Review of patient's allergies indicates:  No Known Allergies    No current facility-administered medications on file prior to encounter.     Current Outpatient Medications on File Prior to Encounter   Medication Sig    amLODIPine (NORVASC) 10 MG tablet Take 10 mg by mouth once daily.    divalproex (DEPAKOTE) 250 MG EC tablet Take 250 mg by mouth 2 (two) times daily.    omeprazole (PRILOSEC) 40 MG capsule Take 40 mg by mouth every morning.    tamsulosin (FLOMAX) 0.4 mg Cap Take 0.4 mg by mouth once daily.    [DISCONTINUED] pantoprazole (PROTONIX) 40 MG tablet Take 1 tablet (40 mg total) by mouth once daily.    [DISCONTINUED] tamsulosin (FLOMAX) 0.4 mg Cp24 Take 1 capsule (0.4 mg total) by mouth once daily.    [DISCONTINUED] nicotine (NICODERM CQ) 14 mg/24 hr Place 1 patch onto the skin once daily.     Family History       Problem Relation (Age of Onset)    Coronary artery disease Mother, Father    Diabetes type II Mother, Father    Hyperlipidemia Mother, Father    Hypertension Mother, Father    Pacemaker/defibrilator Mother          Tobacco Use    Smoking status: Every Day     Current packs/day: 0.50     Average packs/day: 0.5 packs/day for 20.0 years (10.0 ttl pk-yrs)     Types: Cigarettes    Smokeless tobacco: Never   Substance and Sexual Activity    Alcohol use: Yes     Comment: 4 40oz a week    Drug use: Yes     Types: Marijuana    Sexual activity: Not on file     Review of Systems   Constitutional:  Positive for appetite change and unexpected weight change. Negative for chills and fever.   HENT:  Negative for rhinorrhea and sore throat.    Eyes:  Negative for photophobia and visual disturbance.   Respiratory:  Negative for cough and shortness of breath.    Cardiovascular:  Negative for chest pain and  palpitations.   Gastrointestinal:  Positive for blood in stool, constipation, nausea and vomiting. Negative for diarrhea.   Genitourinary:  Positive for decreased urine volume. Negative for dysuria and frequency.   Musculoskeletal:  Negative for back pain and gait problem.   Neurological:  Negative for weakness and headaches.   Psychiatric/Behavioral:  Negative for confusion and dysphoric mood.      Objective:     Vital Signs (Most Recent):  Temp: 97.8 °F (36.6 °C) (08/07/24 0324)  Pulse: (!) 56 (08/07/24 1502)  Resp: 13 (08/07/24 1502)  BP: (!) 106/55 (08/07/24 1502)  SpO2: 96 % (08/07/24 1502) Vital Signs (24h Range):  Temp:  [97.8 °F (36.6 °C)] 97.8 °F (36.6 °C)  Pulse:  [56-97] 56  Resp:  [13-20] 13  SpO2:  [95 %-99 %] 96 %  BP: (106-191)/() 106/55     Weight: 74.8 kg (165 lb)  Body mass index is 23.01 kg/m².     Physical Exam  Constitutional:       General: He is in acute distress.      Appearance: He is ill-appearing.   HENT:      Head: Normocephalic.      Nose: No congestion.      Mouth/Throat:      Mouth: Mucous membranes are moist.   Eyes:      Extraocular Movements: Extraocular movements intact.      Conjunctiva/sclera: Conjunctivae normal.      Pupils: Pupils are equal, round, and reactive to light.   Cardiovascular:      Rate and Rhythm: Regular rhythm. Bradycardia present.      Pulses: Normal pulses.      Heart sounds: Normal heart sounds. No murmur heard.     No gallop.   Pulmonary:      Effort: Pulmonary effort is normal.      Breath sounds: Normal breath sounds.   Abdominal:      Tenderness: There is abdominal tenderness.      Comments: Complains of pain with light touch   Musculoskeletal:         General: Normal range of motion.      Cervical back: Normal range of motion and neck supple.   Skin:     General: Skin is warm and dry.   Neurological:      General: No focal deficit present.      Mental Status: He is alert and oriented to person, place, and time.   Psychiatric:      Comments: Anxious               CRANIAL NERVES     CN III, IV, VI   Pupils are equal, round, and reactive to light.       Significant Labs: All pertinent labs within the past 24 hours have been reviewed.    Significant Imaging: I have reviewed all pertinent imaging results/findings within the past 24 hours.

## 2024-08-07 NOTE — ASSESSMENT & PLAN NOTE
"Hypercalcemia is likely multifactorial - due to dehydration/volume depletion plus primary hyperparathyroidism.  Treat with IVF  Ionized calcium pending  See "REMINGTON"  "

## 2024-08-07 NOTE — ED NOTES
Ha Li, a 58 y.o. male presents to the ED with x2 days of epigastric pain, nausea, vomiting. Pt states this is a chronic issue. X1 day of light red blood in stool, states it is sometimes painful, denies hx of hemorrhoids. NAD, VSS.     Pt resting comfortably. Connected to cardiac monitor, BP cuff, and pulse oximeter. ED workup in progress. Call light within reach. Safety measures in place. Denies further needs. Plan of care ongoing.      Chief Complaint   Patient presents with    Abdominal Pain     Patient presents to the ED via [facility] with complaints of having abdominal pain with nausea, vomiting, and having bloody stools.      Review of patient's allergies indicates:  No Known Allergies  Past Medical History:   Diagnosis Date    Chronic prostatitis 3/2/2017    GERD without esophagitis     Hepatic steatosis 3/2/2017    Per CT scan     No past surgical history on file.

## 2024-08-07 NOTE — Clinical Note
Diagnosis: REMINGTON (acute kidney injury) [[id number]]   Future Attending Provider: DANE MERAZ [[id number]]   Reason for IP Medical Treatment  (Clinical interventions that can only be accomplished in the IP setting? ) :: IV fluids, nephrology consult, recheck labs   Plans for Post-Acute care--if anticipated (pick the single best option):: A. No post acute care anticipated at this time   Special Needs:: No Special Needs [1]

## 2024-08-07 NOTE — NURSING
1805  Patient received in room 320. Aox4. Vitals stable in room air. Patient started on telemetry monitor. He complains of having abdominal pain 5/10 and some intermittent nausea. Patient made aware about him being on clear liquid diet ., and he was made aware about how to use the call light if needed.

## 2024-08-07 NOTE — ASSESSMENT & PLAN NOTE
Anion gap metabolic acidosis  Volume depletion  Hyperuricemia  Hypercalcemia  Hyponatremia  - Patient presented with severe volume depletion, electrolyte derangement and REMINGTON after several days of vomiting and poor oral intake  - Baseline labs unclear, did not have evidence of CKD several years ago but has had hypercalcemia in the past and was referred to endocrinology  - History of BPH and has decreased UOP but no obstruction noted on CT (no hydronephrosis)  - Treating with aggressive IVF, nephrology consulted.  Additional labs ordered and pending - ionized calcium, PSA, Kappa/Lambda light chains  - Additional findings of hyperuricemia, hyponatremia, severe hypercalcemia, elevated PTH (consistent with primary hyperparathyroidism).  - Serial labs ordered

## 2024-08-07 NOTE — ED PROVIDER NOTES
Encounter Date: 8/7/2024    SCRIBE #1 NOTE: I, Nii Killian, am scribing for, and in the presence of,  Dianne Potts MD. I have scribed the following portions of the note - Other sections scribed: HPI, ROS, PE, MDM.       History     Chief Complaint   Patient presents with    Abdominal Pain     Patient presents to the ED via [facility] with complaints of having abdominal pain with nausea, vomiting, and having bloody stools.      Time seen by physician: 6:20 AM    This is a 58 y.o. male with complaint of vomiting over the past three days. He states that he has trouble keeping anything down including water. He says he has been producing dark urine and urinating less often. He says he has lost seven pounds during this time. He says he has a fever and feels hot. He had epigastric abdominal pain that is now concentrated in the lower right quadrant. He denies diarrhea and painful urination. He denies runny nose, cough, and sore throat. He reports drinking beer daily.    Patient denies any other complaints at this time.    The history is provided by the patient.     Review of patient's allergies indicates:  No Known Allergies  Past Medical History:   Diagnosis Date    Chronic prostatitis 3/2/2017    GERD without esophagitis     Hepatic steatosis 3/2/2017    Per CT scan     History reviewed. No pertinent surgical history.  Family History   Problem Relation Name Age of Onset    Hypertension Mother      Hyperlipidemia Mother      Coronary artery disease Mother      Pacemaker/defibrilator Mother      Diabetes type II Mother      Diabetes type II Father      Hypertension Father      Hyperlipidemia Father      Coronary artery disease Father       Social History     Tobacco Use    Smoking status: Every Day     Current packs/day: 0.50     Average packs/day: 0.5 packs/day for 20.0 years (10.0 ttl pk-yrs)     Types: Cigarettes    Smokeless tobacco: Never   Substance Use Topics    Alcohol use: Yes     Comment: 4 40oz a week    Drug  use: Yes     Types: Marijuana     Review of Systems   Constitutional:  Positive for fever.   HENT:  Negative for rhinorrhea and sore throat.    Respiratory:  Negative for cough.    Gastrointestinal:  Positive for abdominal pain and vomiting. Negative for diarrhea.   Genitourinary:  Negative for dysuria.       Physical Exam     Initial Vitals [08/07/24 0324]   BP Pulse Resp Temp SpO2   (!) 160/110 91 20 97.8 °F (36.6 °C) 98 %      MAP       --         Physical Exam    Nursing note and vitals reviewed.  Constitutional: He appears well-developed and well-nourished.   Ill-appearing, nontoxic   HENT:   Head: Normocephalic and atraumatic.   Eyes: Conjunctivae and EOM are normal. Pupils are equal, round, and reactive to light.   Neck: Neck supple.   Normal range of motion.  Cardiovascular:  Normal rate, regular rhythm, normal heart sounds and intact distal pulses.     Exam reveals no gallop and no friction rub.       No murmur heard.  Pulmonary/Chest: Breath sounds normal. No stridor. No respiratory distress. He has no wheezes. He has no rhonchi. He has no rales. He exhibits no tenderness.   Abdominal: Abdomen is soft. Bowel sounds are normal. He exhibits no distension. There is abdominal tenderness.   Right-sided abdominal tenderness. There is no rebound and no guarding.   Musculoskeletal:         General: Normal range of motion.      Cervical back: Normal range of motion and neck supple.     Neurological: He is alert and oriented to person, place, and time. He has normal strength. No cranial nerve deficit. GCS score is 15. GCS eye subscore is 4. GCS verbal subscore is 5. GCS motor subscore is 6.   Skin: Skin is warm and dry. Capillary refill takes less than 2 seconds.   Psychiatric: He has a normal mood and affect.         ED Course   Procedures  Labs Reviewed   CBC W/ AUTO DIFFERENTIAL - Abnormal       Result Value    WBC 16.45 (*)     RBC 6.19      Hemoglobin 17.5      Hematocrit 51.3      MCV 83      MCH 28.3       MCHC 34.1      RDW 13.8      Platelets 423      MPV 10.8      Immature Granulocytes 0.4      Gran # (ANC) 11.1 (*)     Immature Grans (Abs) 0.06 (*)     Lymph # 3.4      Mono # 1.8 (*)     Eos # 0.0      Baso # 0.06      nRBC 0      Gran % 67.4      Lymph % 20.8      Mono % 10.8      Eosinophil % 0.2      Basophil % 0.4      Differential Method Automated     COMPREHENSIVE METABOLIC PANEL - Abnormal    Sodium 129 (*)     Potassium 4.3      Chloride 93 (*)     CO2 16 (*)     Glucose 113 (*)     BUN 61 (*)     Creatinine 4.4 (*)     Calcium 13.0 (*)     Total Protein 9.6 (*)     Albumin 5.3 (*)     Total Bilirubin 1.4 (*)     Alkaline Phosphatase 90      AST 17      ALT 12      eGFR 15 (*)     Anion Gap 20 (*)     Narrative:     Calcium critical result(s) called and verbal readback obtained from   Allyssa Segovia RN by DETL 08/07/2024 05:02   LIPASE    Lipase 39     PROTIME-INR    Prothrombin Time 10.9      INR 1.0     VITAMIN D   MARTINEZ'S STAIN, URINE RANDOM   CREATININE, URINE, RANDOM   SODIUM, URINE, RANDOM   URIC ACID   CHLORIDE, URINE, RANDOM   OSMOLALITY, URINE RANDOM          Imaging Results              CT Abdomen Pelvis  Without Contrast (Final result)  Result time 08/07/24 07:36:21      Final result by Faustino Ferrara MD (08/07/24 07:36:21)                   Impression:      1. No definite acute findings identified in the abdomen or pelvis by unenhanced CT technique to account for patient's reported symptoms.  2. Additional details of chronic and incidental findings, as provided in the body report.      Electronically signed by: Faustino Ferrara  Date:    08/07/2024  Time:    07:36               Narrative:    EXAMINATION:  CT ABDOMEN PELVIS WITHOUT CONTRAST    CLINICAL HISTORY:  Abdominal abscess/infection suspected;Bowel obstruction suspected;Nausea/vomiting;    TECHNIQUE:  Low dose axial images, sagittal and coronal reformations were obtained from the lung bases to the pubic symphysis.    COMPARISON:  CT  of the abdomen and pelvis performed 03/01/2017 and 03/29/2010.    FINDINGS:  This examination is limited due to lack of intravenous contrast.    Lower chest: Unremarkable.    Liver: Normal contour.    Gallbladder and bile ducts: Unremarkable.    Pancreas: Normal contour.    Spleen: Normal contour.    Adrenals: Normal contour.    Kidneys: Normal contour.  Subcentimeter hypodense lesion the upper to midpole left kidney, too small to definitely characterize.  Additional small hypodense lesions in the kidneys demonstrated on 03/01/2017 CT are not well characterized by current unenhanced technique.    Lymph nodes: No abdominal or pelvic lymphadenopathy.    Bowel and mesentery: No evidence of bowel obstruction.  Moderate volume colonic stool.  Mild colonic diverticulosis.  Normal appendix.    Abdominal aorta: Nonaneurysmal.  Mild-to-moderate atherosclerotic calcification.    Inferior vena cava: Unremarkable.    Free fluid or free air: None.    Pelvis: Prostate gland appears enlarged.    Urinary bladder: Mild circumferential urinary bladder wall thickening, may relate to bladder outlet obstruction.    Body wall: Unremarkable.    Bones: No acute change.  Degenerative findings of the spine, most advanced at the L4-5 and L5-S1 levels.  Foraminal narrowing moderate to severe bilaterally L4-5 and severe bilaterally at L5-S1.                                       Medications   sodium chloride 0.9% bolus 1,000 mL 1,000 mL (has no administration in time range)   amLODIPine tablet 10 mg (has no administration in time range)   divalproex EC tablet 250 mg (has no administration in time range)   sodium chloride 0.9% flush 10 mL (has no administration in time range)   acetaminophen tablet 650 mg (has no administration in time range)   naloxone 0.4 mg/mL injection 0.02 mg (has no administration in time range)   trazodone split tablet 25 mg (has no administration in time range)   ondansetron injection 8 mg (has no administration in time  range)   tamsulosin 24 hr capsule 0.4 mg (has no administration in time range)   HYDROmorphone injection 1 mg (has no administration in time range)   oxyCODONE immediate release tablet Tab 10 mg (has no administration in time range)   droPERidol injection 1.25 mg (1.25 mg Intravenous Given 8/7/24 0409)   0.9%  NaCl infusion (0 mLs Intravenous Stopped 8/7/24 0554)   pantoprazole injection 40 mg (40 mg Intravenous Given 8/7/24 0409)   aluminum-magnesium hydroxide-simethicone 200-200-20 mg/5 mL suspension 30 mL (30 mLs Oral Given 8/7/24 0601)     And   LIDOcaine viscous HCl 2% oral solution 15 mL (15 mLs Oral Given 8/7/24 0601)     Medical Decision Making  58-year-old male presents emergency department complaining of abdominal pain with nausea and vomiting.  He has had similar episodes of epigastric pain in the past but this is worse.  No obvious triggers.  Patient does drink alcohol at least several days a week.  History of hypercalcemia since at least 2017, no clear identified cause upon chart review.  Patient noted to have very elevated BUN and creatinine as well as elevated white blood cell count performed prior to my evaluation.  Added CT, IV fluids, patient denies current need for pain or nausea control.  Patient has had weight loss over the past few days but no dramatic weight loss.  Also complain of 1 episode of bloody stool.  This not continued in the emergency department.  H and H are elevated, likely due to hemoconcentration.  Will plan to admit patient to the hospital with Nephrology consultation, aggressive hydration, GI if needed.     Differential diagnosis includes, but is not limited to:  GERD, intestinal spasm, gastroenteritis, gastritis, ulcer, cholecystitis, cholelithiasis, gallstones, pancreatitis, ileus, small bowel obstruction, appendicitis, diverticulitis, colitis, constipation, intestinal gas pain.        Amount and/or Complexity of Data Reviewed  External Data Reviewed: notes.  Labs: ordered.  Decision-making details documented in ED Course.  Radiology: ordered and independent interpretation performed.    Risk  Decision regarding hospitalization.    Discussed patient with Diaz Terry NP with urology service.  Will evaluate.  I discussed the patient's presentation, findings and response to treatment in the ED with the hospitalist on call who will admit for further treatment and evaluation.  Dr. Elder will be the admitting physician.            Scribe Attestation:   Scribe #1: I performed the above scribed service and the documentation accurately describes the services I performed. I attest to the accuracy of the note.    Physician Attestation for Scribe: I, Dianne Potts MD, reviewed documentation as scribed in my presence, which is both accurate and complete.      ED Course as of 08/07/24 0908   Wed Aug 07, 2024   0608 BUN(!): 61 [LF]   0609 Creatinine(!): 4.4 [LF]   0609 Calcium(!!): 13.0 [LF]   0609 WBC(!): 16.45 [LF]      ED Course User Index  [LF] Dianne Potts MD                           Clinical Impression:  Final diagnoses:  [N17.9] REMINGTON (acute kidney injury) (Primary)  [E86.0] Dehydration  [K21.9] Gastroesophageal reflux disease, unspecified whether esophagitis present  [E83.52] Hypercalcemia  [Z78.9] Alcohol use          ED Disposition Condition    Admit Stable                Dianne Potts MD  08/07/24 0908

## 2024-08-07 NOTE — CONSULTS
Consult Note  Nephrology    Consult Requested By: Delfina Elder MD  Reason for Consult: REMINGTON/>Ca/<NA    SUBJECTIVE:     History of Present Illness:  Patient is a 58 y.o. male presents with worsening abd pain/N/V and found to have severe dehydration/REMINGTON/electrolyte imbalances.  Hx as below.  Consulted for evaluation.  Pt seen and examined in ED w/ Dr. Elder.  Pt crying with abd pain.  Labs noted with Creat 4.4/BUN 61//bicarb 16/Ca 13.  Has been referred to Endo for >dereje as outpt but hasn't seen yet.  W/up ordered.    Has been unable to eat/drink over past few days with abd pain.  CT reviewed.     EPIC reviewed but no labs/notes avail since 2021.  Dereje 11.3 (3/21)     Past Medical History:   Diagnosis Date    Chronic prostatitis 3/2/2017    GERD without esophagitis     Hepatic steatosis 3/2/2017    Per CT scan     History reviewed. No pertinent surgical history.  Family History   Problem Relation Name Age of Onset    Hypertension Mother      Hyperlipidemia Mother      Coronary artery disease Mother      Pacemaker/defibrilator Mother      Diabetes type II Mother      Diabetes type II Father      Hypertension Father      Hyperlipidemia Father      Coronary artery disease Father       Social History     Tobacco Use    Smoking status: Every Day     Current packs/day: 0.50     Average packs/day: 0.5 packs/day for 20.0 years (10.0 ttl pk-yrs)     Types: Cigarettes    Smokeless tobacco: Never   Substance Use Topics    Alcohol use: Yes     Comment: 4 40oz a week    Drug use: Yes     Types: Marijuana       Review of patient's allergies indicates:  No Known Allergies     Review of Systems:  Constitutional: No fever or chills  Respiratory: No cough or shortness of breath  Cardiovascular: No chest pain or palpitations  Gastrointestinal: pain/N/V  Neurological: No confusion or weakness    OBJECTIVE:     Vital Signs (Most Recent)  Temp: 97.8 °F (36.6 °C) (08/07/24 0324)  Pulse: 60 (08/07/24 0954)  Resp: 14 (08/07/24  "0954)  BP: 137/82 (08/07/24 0932)  SpO2: 96 % (08/07/24 0954)    Vital Signs Range (Last 24H):  Temp:  [97.8 °F (36.6 °C)]   Pulse:  [60-97]   Resp:  [14-20]   BP: (137-191)/()   SpO2:  [95 %-98 %]     No intake or output data in the 24 hours ending 08/07/24 1039    Physical Exam:  General appearance: ill appearing/frail/malnourished.   Eyes:  Conjunctivae/corneas clear. PERRL.  Lungs: Normal respiratory effort,   clear to auscultation bilaterally   Heart: Regular rate and rhythm, S1, S2 normal, no murmur, rub or isma.  Abdomen: Soft, very tender non-distended; bowel sounds hypo; no masses,  no organomegaly  Extremities: No cyanosis or clubbing. No edema.    Skin: Skin color, texture, turgor normal. No rashes or lesions  Neurologic: Normal strength and tone. No focal numbness or weakness       Laboratory:  Recent Labs   Lab 08/07/24  0409   WBC 16.45*   RBC 6.19   HGB 17.5   HCT 51.3      MCV 83   MCH 28.3   MCHC 34.1     BMP:   Recent Labs   Lab 08/07/24  0409   *   *   K 4.3   CL 93*   CO2 16*   BUN 61*   CREATININE 4.4*   CALCIUM 13.0*     Lab Results   Component Value Date    CALCIUM 13.0 (HH) 08/07/2024    PHOS 2.4 (L) 03/03/2017     BNP  No results for input(s): "BNP", "BNPTRIAGEBLO" in the last 168 hours.  Lab Results   Component Value Date    URICACID 9.3 (H) 08/07/2024   No results found for: "IRON", "TIBC", "FERRITIN", "SATURATEDIRO"  Lab Results   Component Value Date    PTH 78.0 (H) 03/02/2017    CALCIUM 13.0 (HH) 08/07/2024    CAION 1.34 03/02/2017    PHOS 2.4 (L) 03/03/2017       Diagnostic Results:  CT Abdomen Pelvis  Without Contrast   Final Result      1. No definite acute findings identified in the abdomen or pelvis by unenhanced CT technique to account for patient's reported symptoms.   2. Additional details of chronic and incidental findings, as provided in the body report.         Electronically signed by: Faustino Ferrara   Date:    08/07/2024   Time:    07:36    "       ASSESSMENT/PLAN:     REMINGTON on unknown baseline (last creat 0.8 in 3/21) secondary to severe volume depletion/hypercalcemia/N/V/ plus other:  -CT w/o obstruction  -w/up ordered.  -IVF's for now and trend  -no need for RRT at this time.  -denies NSAIDs.   -Renally dose meds, avoid nephrotoxins, and monitor I/O's closely.    Hypovolemic Hyponatremia:  -urine ordered but looks dry  -isotonic saline and monitor.     AGMA:  -secondary to above  -will alternative LR/Saline infusions until able to tolerate oral.    Severe hypercalcemia:  -last known Dereje of 11.3 back in 3/2021  -scheduled to see endo as outpt.  -check PTH/Vit D/Ionized Calcium/Kappa chain  -IVF's/IVF's/IVFs  -may need either calcitonin/sensipar pending w/up.  -check PSA  -no lytic lesions on CT    Abd pain w/ dark stools/N/V:  -consult GI  -ok for PPI    Thanks for consult  See above  Will follow along.         High MDM complexity necessary to treat or prevent imminent or life-threatening deterioration of the following conditions: REMINGTON, <NA/>Ca    Time spent personally by me on the following activities 90 min: development of treatment plan with patient or surrogate, discussions with consultants, interpretation of cardiac output measurements, examination of patient, ordering and performing treatments and interventions, evaluation of patient's response to treatment, obtaining history from patient or surrogate, ordering and review of laboratory studies, ordering and review of radiographic studies, re-evaluation of patient's condition, pulse oximetry and blood draw for specimens    I evaluated the patient with the NP and was present during the history and performed the physical examination above.  I formulated all medical decision making.  Although the note was started by the NP, I have taken responsibility for the plan and have signed this note. I was present for more that 50% of total time: 90min    Mert Saleh MD  Nephrology

## 2024-08-07 NOTE — HPI
Mr. Li is a 58-year-old man who presented with persistent nausea, vomiting and abdominal pain that started 3 days ago.  Patient has not been able to eat anything during that time and he says he has lost a significant amount of weight.  He also had a single episode of bright red blood from his rectum while straining to have a bowel movement.  His urine has been dark and he has been urinating less.  He has a history of GERD for which he takes Prilosec as needed.  He also uses NSAIDs from time to time for aches and pains.  Workup in the ED was notable for BUN/creatinine 61/1.4, bicarb 16, anion gap 20, sodium 129, calcium 13.  Patient is being admitted for acute kidney injury and possible GI bleed.    Medical history includes hypertension, chronic GE reflux and BPH.  He is a  and has had various injuries while working.  He is a smoker, about 1/2 pack per day and he drinks a couple of beers although states this is not every day.  He has been noted to have hypercalcemia in the past and apparently has been referred to endocrinology for workup.

## 2024-08-07 NOTE — CONSULTS
.[facility] - Emergency Dept  Gastroenterology  Consult Note    Patient Name: Ha Li  MRN: [id number]  Admission Date: 8/7/2024  Hospital Length of Stay: 0 days  Code Status: Full Code   Primary Care Physician: Rory Estrada MD  Principal Problem:<principal problem not specified>    Inpatient consult to Gastroenterology  Consult performed by: Frank Powell MD  Consult ordered by: Delfina Elder MD        Subjective:     Chief complaint:  Nausea, vomiting, abdominal pain    HPI:  58-year-old man who developed nausea and vomiting associated with mid abdominal pain 3 days ago.  At that time, he was also having constipation with significant straining.  When he finally had a bowel movement saw a small to moderate amount of red blood in the toilet.  There has been no bleeding since.  He has never had a colonoscopy.  He was having abdominal pain when he came to the ED, but he is not having it now.  He has received pain medication.  Nausea and vomiting has also resolved a couple of days ago.  He says he has not eaten in the last 2-3 days.  He drinks alcohol several days per week.    Past medical history:  Past Medical History:   Diagnosis Date    Chronic prostatitis 3/2/2017    GERD without esophagitis     Hepatic steatosis 3/2/2017    Per CT scan       Past surgical history:  History reviewed. No pertinent surgical history.    Social history:  Social History     Socioeconomic History    Marital status:    Occupational History    Occupation: [employer]   Tobacco Use    Smoking status: Every Day     Current packs/day: 0.50     Average packs/day: 0.5 packs/day for 20.0 years (10.0 ttl pk-yrs)     Types: Cigarettes    Smokeless tobacco: Never   Substance and Sexual Activity    Alcohol use: Yes     Comment: 4 40oz a week    Drug use: Yes     Types: Marijuana       Family history:  Family History   Problem Relation Name Age of Onset    Hypertension Mother      Hyperlipidemia Mother      Coronary artery  disease Mother      Pacemaker/defibrilator Mother      Diabetes type II Mother      Diabetes type II Father      Hypertension Father      Hyperlipidemia Father      Coronary artery disease Father         Medications:  (Not in a hospital admission)      Allergies:  Review of patient's allergies indicates:  No Known Allergies    Review of systems:  CONSTITUTIONAL: Negative for fever, chills.  HEENT: Negative for hearing or vision changes, nasal congestion, dry mouth, sore throat.  CARDIOVASCULAR: Negative for chest pain or palpitations.  RESPIRATORY: Negative for SOB or cough.  GASTROINTESTINAL: See HPI  GENITOURINARY: Negative for dysuria or hematuria.  NEUROLOGIC: Negative for headaches  Aside from above positives, complete 10 point review of systems negative.    Objective:     Vital Signs (Most Recent):  Temp: 97.8 °F (36.6 °C) (08/07/24 0324)  Pulse: 60 (08/07/24 1202)  Resp: 15 (08/07/24 1132)  BP: 118/72 (08/07/24 1202)  SpO2: 98 % (08/07/24 1202) Vital Signs (24h Range):  Temp:  [97.8 °F (36.6 °C)] 97.8 °F (36.6 °C)  Pulse:  [60-97] 60  Resp:  [14-20] 15  SpO2:  [95 %-99 %] 98 %  BP: (118-191)/() 118/72     Physical examination:  General: well developed, disheveled, no apparent distress  HENT: NCAT, hearing grossly intact  Eyes:  anicteric sclera  Cardiovascular: Regular rate and rhythm.   Lungs: Non-labored respirations. Breath sounds equal.   Abdomen: soft, NTND, normoactive BS  Extremities: No C/C/E  Neuro: AA&O x 3  Psych: Appropriate mood and affect.   Musculoskeletal: no deformity    Labs:  CBC:   Recent Labs   Lab 08/07/24 0409   WBC 16.45*   HGB 17.5   HCT 51.3        CMP:   Recent Labs   Lab 08/07/24 0409   *   CALCIUM 13.0*   ALBUMIN 5.3*   PROT 9.6*   *   K 4.3   CO2 16*   CL 93*   BUN 61*   CREATININE 4.4*   ALKPHOS 90   ALT 12   AST 17   BILITOT 1.4*     Coagulation:   Recent Labs   Lab 08/07/24 0409   INR 1.0     Lipase:   Recent Labs   Lab 08/07/24 0409   LIPASE 39        Imaging:    CT Impression:     1. No definite acute findings identified in the abdomen or pelvis by unenhanced CT technique to account for patient's reported symptoms.  2. Additional details of chronic and incidental findings, as provided in the body report.    Assessment:   58-year-old man who developed nausea, vomiting, and abdominal pain.  The nausea has improved, but he was having pain this morning.  The etiology is unclear.  He has a history of GERD.  There is no pain now.  The CT was unremarkable.  He had episode of hematochezia presumed from hemorrhoids.  This occurred after straining.  However, he has never had a colonoscopy.  Therefore, this should be scheduled.    He also has acute kidney injury and is being evaluated by Nephrology.  This may be from dehydration.  His blood count suggests that he is hemoconcentrated.    Plan:   1. Monitor for further abdominal pain  2. Follow-up with GI after discharge for an outpatient colonoscopy  3. Avoid alcohol       Thank you for your consult.     Frank Powell MD  Gastroenterology  [facility] - Emergency Dept

## 2024-08-07 NOTE — ED NOTES
Pt resting comfortably in bed. Pt states that nausea is creeping back up and complains of abdominal pain 5/10.

## 2024-08-07 NOTE — ASSESSMENT & PLAN NOTE
Single episode BRBPR, GI has evaluated and his abdominal pain had resolved   Has history of hemorrhoids which is felt to be the most likely cause of the BRBPR.  Will monitor

## 2024-08-07 NOTE — H&P
[facility]  History & Physical    Patient Name: Ha Li  MRN: [id number]  Patient Class: IP- Inpatient  Admission Date: 8/7/2024  Attending Physician: Delfina Elder MD   Primary Care Provider: Rory Estrada MD         Patient information was obtained from patient, past medical records, and ER records.     Subjective:     Principal Problem:REMINGTON (acute kidney injury)    Chief Complaint:   Chief Complaint   Patient presents with    Abdominal Pain     Patient presents to the ED via [facility] with complaints of having abdominal pain with nausea, vomiting, and having bloody stools.         HPI: Mr. Li is a 58-year-old man who presented with persistent nausea, vomiting and abdominal pain that started 3 days ago.  Patient has not been able to eat anything during that time and he says he has lost a significant amount of weight.  He also had a single episode of bright red blood from his rectum while straining to have a bowel movement.  His urine has been dark and he has been urinating less.  He has a history of GERD for which he takes Prilosec as needed.  He also uses NSAIDs from time to time for aches and pains.  Workup in the ED was notable for BUN/creatinine 61/1.4, bicarb 16, anion gap 20, sodium 129, calcium 13.  Patient is being admitted for acute kidney injury and possible GI bleed.    Medical history includes hypertension, chronic GE reflux and BPH.  He is a  and has had various injuries while working.  He is a smoker, about 1/2 pack per day and he drinks a couple of beers although states this is not every day.  He has been noted to have hypercalcemia in the past and apparently has been referred to endocrinology for workup.              Past Medical History:   Diagnosis Date    Chronic prostatitis 3/2/2017    GERD without esophagitis     Hepatic steatosis 3/2/2017    Per CT scan       History reviewed. No pertinent surgical history.    Review of  patient's allergies indicates:  No Known Allergies    No current facility-administered medications on file prior to encounter.     Current Outpatient Medications on File Prior to Encounter   Medication Sig    amLODIPine (NORVASC) 10 MG tablet Take 10 mg by mouth once daily.    divalproex (DEPAKOTE) 250 MG EC tablet Take 250 mg by mouth 2 (two) times daily.    omeprazole (PRILOSEC) 40 MG capsule Take 40 mg by mouth every morning.    tamsulosin (FLOMAX) 0.4 mg Cap Take 0.4 mg by mouth once daily.    [DISCONTINUED] pantoprazole (PROTONIX) 40 MG tablet Take 1 tablet (40 mg total) by mouth once daily.    [DISCONTINUED] tamsulosin (FLOMAX) 0.4 mg Cp24 Take 1 capsule (0.4 mg total) by mouth once daily.    [DISCONTINUED] nicotine (NICODERM CQ) 14 mg/24 hr Place 1 patch onto the skin once daily.     Family History       Problem Relation (Age of Onset)    Coronary artery disease Mother, Father    Diabetes type II Mother, Father    Hyperlipidemia Mother, Father    Hypertension Mother, Father    Pacemaker/defibrilator Mother          Tobacco Use    Smoking status: Every Day     Current packs/day: 0.50     Average packs/day: 0.5 packs/day for 20.0 years (10.0 ttl pk-yrs)     Types: Cigarettes    Smokeless tobacco: Never   Substance and Sexual Activity    Alcohol use: Yes     Comment: 4 40oz a week    Drug use: Yes     Types: Marijuana    Sexual activity: Not on file     Review of Systems   Constitutional:  Positive for appetite change and unexpected weight change. Negative for chills and fever.   HENT:  Negative for rhinorrhea and sore throat.    Eyes:  Negative for photophobia and visual disturbance.   Respiratory:  Negative for cough and shortness of breath.    Cardiovascular:  Negative for chest pain and palpitations.   Gastrointestinal:  Positive for blood in stool, constipation, nausea and vomiting. Negative for diarrhea.   Genitourinary:  Positive for decreased urine volume. Negative for dysuria and frequency.    Musculoskeletal:  Negative for back pain and gait problem.   Neurological:  Negative for weakness and headaches.   Psychiatric/Behavioral:  Negative for confusion and dysphoric mood.      Objective:     Vital Signs (Most Recent):  Temp: 97.8 °F (36.6 °C) (08/07/24 0324)  Pulse: (!) 56 (08/07/24 1502)  Resp: 13 (08/07/24 1502)  BP: (!) 106/55 (08/07/24 1502)  SpO2: 96 % (08/07/24 1502) Vital Signs (24h Range):  Temp:  [97.8 °F (36.6 °C)] 97.8 °F (36.6 °C)  Pulse:  [56-97] 56  Resp:  [13-20] 13  SpO2:  [95 %-99 %] 96 %  BP: (106-191)/() 106/55     Weight: 74.8 kg (165 lb)  Body mass index is 23.01 kg/m².     Physical Exam  Constitutional:       General: He is in acute distress.      Appearance: He is ill-appearing.   HENT:      Head: Normocephalic.      Nose: No congestion.      Mouth/Throat:      Mouth: Mucous membranes are moist.   Eyes:      Extraocular Movements: Extraocular movements intact.      Conjunctiva/sclera: Conjunctivae normal.      Pupils: Pupils are equal, round, and reactive to light.   Cardiovascular:      Rate and Rhythm: Regular rhythm. Bradycardia present.      Pulses: Normal pulses.      Heart sounds: Normal heart sounds. No murmur heard.     No gallop.   Pulmonary:      Effort: Pulmonary effort is normal.      Breath sounds: Normal breath sounds.   Abdominal:      Tenderness: There is abdominal tenderness.      Comments: Complains of pain with light touch   Musculoskeletal:         General: Normal range of motion.      Cervical back: Normal range of motion and neck supple.   Skin:     General: Skin is warm and dry.   Neurological:      General: No focal deficit present.      Mental Status: He is alert and oriented to person, place, and time.   Psychiatric:      Comments: Anxious              CRANIAL NERVES     CN III, IV, VI   Pupils are equal, round, and reactive to light.       Significant Labs: All pertinent labs within the past 24 hours have been reviewed.    Significant Imaging: I  "have reviewed all pertinent imaging results/findings within the past 24 hours.  Assessment/Plan:     * REMINGTON (acute kidney injury)  Anion gap metabolic acidosis  Volume depletion  Hyperuricemia  Hypercalcemia  Hyponatremia  - Patient presented with severe volume depletion, electrolyte derangement and REMINGTON after several days of vomiting and poor oral intake  - Baseline labs unclear, did not have evidence of CKD several years ago but has had hypercalcemia in the past and was referred to endocrinology  - History of BPH and has decreased UOP but no obstruction noted on CT (no hydronephrosis)  - Treating with aggressive IVF, nephrology consulted.  Additional labs ordered and pending - ionized calcium, PSA, Kappa/Lambda light chains  - Additional findings of hyperuricemia, hyponatremia, severe hypercalcemia, elevated PTH (consistent with primary hyperparathyroidism).  - Serial labs ordered    Volume depletion, gastrointestinal loss  See "REMINGTON"      Hypercalcemia  Hypercalcemia is likely multifactorial - due to dehydration/volume depletion plus primary hyperparathyroidism.  Treat with IVF  Ionized calcium pending  See "REMINGTON"    Metabolic acidosis, increased anion gap (IAG)  See "REMINGTON"      Blood in stool  Single episode BRBPR, GI has evaluated and his abdominal pain had resolved   Has history of hemorrhoids which is felt to be the most likely cause of the BRBPR.  Will monitor      Primary hypertension  CHome meds for hypertension were reviewed and noted below.   Hypertension Medications               amLODIPine (NORVASC) 10 MG tablet Take 10 mg by mouth once daily.            While in the hospital, will continue home medications    Tobacco abuse  Will discuss with patient when he is less distressed        VTE Risk Mitigation (From admission, onward)           Ordered     IP VTE LOW RISK PATIENT  Once         08/07/24 0843     Place sequential compression device  Until discontinued         08/07/24 0843                   "                  Delfina Strong MD  Department of Hospital Medicine  [facility] - Emergency Dept

## 2024-08-07 NOTE — ED NOTES
Pt resting comfortably. Pt on cardiac monitor, BP and pulse oximeter. Pt trying for urine sample.

## 2024-08-07 NOTE — ASSESSMENT & PLAN NOTE
CHome meds for hypertension were reviewed and noted below.   Hypertension Medications               amLODIPine (NORVASC) 10 MG tablet Take 10 mg by mouth once daily.            While in the hospital, will continue home medications

## 2024-08-08 LAB
ALBUMIN SERPL BCP-MCNC: 3.3 G/DL (ref 3.5–5.2)
ANION GAP SERPL CALC-SCNC: 8 MMOL/L (ref 8–16)
BASOPHILS # BLD AUTO: 0.06 K/UL (ref 0–0.2)
BASOPHILS NFR BLD: 0.7 % (ref 0–1.9)
BUN SERPL-MCNC: 23 MG/DL (ref 6–20)
CA-I BLDV-SCNC: 1.34 MMOL/L (ref 1.06–1.42)
CALCIUM SERPL-MCNC: 9.7 MG/DL (ref 8.7–10.5)
CHLORIDE SERPL-SCNC: 108 MMOL/L (ref 95–110)
CO2 SERPL-SCNC: 21 MMOL/L (ref 23–29)
CREAT SERPL-MCNC: 0.9 MG/DL (ref 0.5–1.4)
DIFFERENTIAL METHOD BLD: ABNORMAL
EOSINOPHIL # BLD AUTO: 0.1 K/UL (ref 0–0.5)
EOSINOPHIL NFR BLD: 1.1 % (ref 0–8)
ERYTHROCYTE [DISTWIDTH] IN BLOOD BY AUTOMATED COUNT: 13.6 % (ref 11.5–14.5)
EST. GFR  (NO RACE VARIABLE): >60 ML/MIN/1.73 M^2
GLUCOSE SERPL-MCNC: 92 MG/DL (ref 70–110)
HCT VFR BLD AUTO: 38.3 % (ref 40–54)
HGB BLD-MCNC: 12.7 G/DL (ref 14–18)
IMM GRANULOCYTES # BLD AUTO: 0.02 K/UL (ref 0–0.04)
IMM GRANULOCYTES NFR BLD AUTO: 0.2 % (ref 0–0.5)
KAPPA LC SER QL IA: 1.84 MG/DL (ref 0.33–1.94)
KAPPA LC/LAMBDA SER IA: 1.06 (ref 0.26–1.65)
LAMBDA LC SER QL IA: 1.74 MG/DL (ref 0.57–2.63)
LYMPHOCYTES # BLD AUTO: 2.6 K/UL (ref 1–4.8)
LYMPHOCYTES NFR BLD: 30.7 % (ref 18–48)
MCH RBC QN AUTO: 28.9 PG (ref 27–31)
MCHC RBC AUTO-ENTMCNC: 33.2 G/DL (ref 32–36)
MCV RBC AUTO: 87 FL (ref 82–98)
MONOCYTES # BLD AUTO: 0.9 K/UL (ref 0.3–1)
MONOCYTES NFR BLD: 10.6 % (ref 4–15)
NEUTROPHILS # BLD AUTO: 4.7 K/UL (ref 1.8–7.7)
NEUTROPHILS NFR BLD: 56.7 % (ref 38–73)
NRBC BLD-RTO: 0 /100 WBC
PHOSPHATE SERPL-MCNC: 1.9 MG/DL (ref 2.7–4.5)
PLATELET # BLD AUTO: 234 K/UL (ref 150–450)
PMV BLD AUTO: 10.6 FL (ref 9.2–12.9)
POTASSIUM SERPL-SCNC: 4.3 MMOL/L (ref 3.5–5.1)
RBC # BLD AUTO: 4.4 M/UL (ref 4.6–6.2)
SODIUM SERPL-SCNC: 137 MMOL/L (ref 136–145)
WBC # BLD AUTO: 8.33 K/UL (ref 3.9–12.7)

## 2024-08-08 PROCEDURE — 63600175 PHARM REV CODE 636 W HCPCS: Performed by: HOSPITALIST

## 2024-08-08 PROCEDURE — 25000003 PHARM REV CODE 250: Performed by: HOSPITALIST

## 2024-08-08 PROCEDURE — 82330 ASSAY OF CALCIUM: CPT | Performed by: NURSE PRACTITIONER

## 2024-08-08 PROCEDURE — 25000003 PHARM REV CODE 250: Performed by: NURSE PRACTITIONER

## 2024-08-08 PROCEDURE — 80069 RENAL FUNCTION PANEL: CPT | Performed by: NURSE PRACTITIONER

## 2024-08-08 PROCEDURE — 63600175 PHARM REV CODE 636 W HCPCS: Performed by: NURSE PRACTITIONER

## 2024-08-08 PROCEDURE — A4216 STERILE WATER/SALINE, 10 ML: HCPCS | Performed by: HOSPITALIST

## 2024-08-08 PROCEDURE — 85025 COMPLETE CBC W/AUTO DIFF WBC: CPT | Performed by: HOSPITALIST

## 2024-08-08 PROCEDURE — 11000001 HC ACUTE MED/SURG PRIVATE ROOM

## 2024-08-08 PROCEDURE — 36415 COLL VENOUS BLD VENIPUNCTURE: CPT | Performed by: NURSE PRACTITIONER

## 2024-08-08 RX ORDER — SODIUM,POTASSIUM PHOSPHATES 280-250MG
2 POWDER IN PACKET (EA) ORAL ONCE
Status: COMPLETED | OUTPATIENT
Start: 2024-08-08 | End: 2024-08-08

## 2024-08-08 RX ORDER — CINACALCET 30 MG/1
30 TABLET, FILM COATED ORAL
Status: DISCONTINUED | OUTPATIENT
Start: 2024-08-08 | End: 2024-08-09 | Stop reason: HOSPADM

## 2024-08-08 RX ORDER — SODIUM CHLORIDE, SODIUM LACTATE, POTASSIUM CHLORIDE, CALCIUM CHLORIDE 600; 310; 30; 20 MG/100ML; MG/100ML; MG/100ML; MG/100ML
INJECTION, SOLUTION INTRAVENOUS CONTINUOUS
Status: DISCONTINUED | OUTPATIENT
Start: 2024-08-08 | End: 2024-08-09 | Stop reason: HOSPADM

## 2024-08-08 RX ADMIN — HYDROMORPHONE HYDROCHLORIDE 1 MG: 1 INJECTION, SOLUTION INTRAMUSCULAR; INTRAVENOUS; SUBCUTANEOUS at 03:08

## 2024-08-08 RX ADMIN — CINACALCET 30 MG: 30 TABLET, FILM COATED ORAL at 09:08

## 2024-08-08 RX ADMIN — HYDROMORPHONE HYDROCHLORIDE 1 MG: 1 INJECTION, SOLUTION INTRAMUSCULAR; INTRAVENOUS; SUBCUTANEOUS at 09:08

## 2024-08-08 RX ADMIN — TAMSULOSIN HYDROCHLORIDE 0.4 MG: 0.4 CAPSULE ORAL at 09:08

## 2024-08-08 RX ADMIN — HYDROMORPHONE HYDROCHLORIDE 1 MG: 1 INJECTION, SOLUTION INTRAMUSCULAR; INTRAVENOUS; SUBCUTANEOUS at 06:08

## 2024-08-08 RX ADMIN — POTASSIUM & SODIUM PHOSPHATES POWDER PACK 280-160-250 MG 2 PACKET: 280-160-250 PACK at 11:08

## 2024-08-08 RX ADMIN — ONDANSETRON 8 MG: 2 INJECTION INTRAMUSCULAR; INTRAVENOUS at 04:08

## 2024-08-08 RX ADMIN — Medication 10 ML: at 09:08

## 2024-08-08 RX ADMIN — DIVALPROEX SODIUM 250 MG: 250 TABLET, DELAYED RELEASE ORAL at 09:08

## 2024-08-08 RX ADMIN — Medication 10 ML: at 06:08

## 2024-08-08 RX ADMIN — SODIUM CHLORIDE, POTASSIUM CHLORIDE, SODIUM LACTATE AND CALCIUM CHLORIDE: 600; 310; 30; 20 INJECTION, SOLUTION INTRAVENOUS at 11:08

## 2024-08-08 RX ADMIN — SODIUM PHOSPHATE, MONOBASIC, MONOHYDRATE AND SODIUM PHOSPHATE, DIBASIC, ANHYDROUS 20.01 MMOL: 142; 276 INJECTION, SOLUTION INTRAVENOUS at 11:08

## 2024-08-08 RX ADMIN — SODIUM CHLORIDE: 9 INJECTION, SOLUTION INTRAVENOUS at 02:08

## 2024-08-08 RX ADMIN — ONDANSETRON 8 MG: 2 INJECTION INTRAMUSCULAR; INTRAVENOUS at 09:08

## 2024-08-08 NOTE — ASSESSMENT & PLAN NOTE
Anion gap metabolic acidosis  Volume depletion  Hyperuricemia  Hypercalcemia  Hyponatremia  - Patient presented with severe volume depletion, electrolyte derangement and REMINGTON after several days of vomiting and poor oral intake  - Baseline labs unclear, did not have evidence of CKD several years ago but has had hypercalcemia in the past and was referred to endocrinology  - History of BPH and has decreased UOP but no obstruction noted on CT (no hydronephrosis)  - Treating with aggressive IVF, nephrology consulted.  Additional labs ordered and pending - ionized calcium, PSA, Kappa/Lambda light chains  - Additional findings of hyperuricemia, hyponatremia, severe hypercalcemia, elevated PTH (consistent with primary hyperparathyroidism).  - Serial labs ordered, has had good improvement with IVF.

## 2024-08-08 NOTE — PLAN OF CARE
Uneventful shift. POC reviewed. Cardiac monitor maintained. Complaints of pain treated with PRN pain medication according to MAR. Pt voids and shifts in bed independently. No injuries, falls, or trauma occurred during shift. Purposeful rounding completed. Bed Bed low and locked, side rails up x3, call light within reach. Safety maintained throughout shift.    Problem: Adult Inpatient Plan of Care  Goal: Plan of Care Review  Outcome: Progressing  Goal: Patient-Specific Goal (Individualized)  Outcome: Progressing  Goal: Absence of Hospital-Acquired Illness or Injury  Outcome: Progressing  Goal: Optimal Comfort and Wellbeing  Outcome: Progressing  Goal: Readiness for Transition of Care  Outcome: Progressing     Problem: Acute Kidney Injury/Impairment  Goal: Fluid and Electrolyte Balance  Outcome: Progressing  Goal: Improved Oral Intake  Outcome: Progressing  Goal: Effective Renal Function  Outcome: Progressing     Problem: Skin Injury Risk Increased  Goal: Skin Health and Integrity  Outcome: Progressing

## 2024-08-08 NOTE — ASSESSMENT & PLAN NOTE
CHome meds for hypertension were reviewed and noted below.   Hypertension Medications               amLODIPine (NORVASC) 10 MG tablet Take 10 mg by mouth once daily.            While in the hospital, will continue home medications  BP now on the low side so will hold amlodipine today

## 2024-08-08 NOTE — PROGRESS NOTES
"Nephrology  Progress Note    Admit Date: 8/7/2024   LOS: 1 day     SUBJECTIVE:     Follow-up For:  REMINGTON (acute kidney injury)    History of Present Illness:  Patient is a 58 y.o. male presents with worsening abd pain/N/V and found to have severe dehydration/REMINGTON/electrolyte imbalances.  Hx as below.  Consulted for evaluation.  Pt seen and examined in ED w/ Dr. Elder.  Pt crying with abd pain.  Labs noted with Creat 4.4/BUN 61//bicarb 16/Ca 13.  Has been referred to Endo for >dereje as outpt but hasn't seen yet.  W/up ordered.    Has been unable to eat/drink over past few days with abd pain.  CT reviewed.      EPIC reviewed but no labs/notes avail since 2021.  Dereje 11.3 (3/21)     Interval History:     Uneventful night.  Much improved.  Discussed with team.  Labs reviewed and updated pt on [facility].  Eager to go home.     Review of Systems:  Constitutional: No fever or chills  Respiratory: No cough or shortness of breath  Cardiovascular: No chest pain or palpitations  Gastrointestinal: No nausea or vomiting  Neurological: No confusion or weakness    OBJECTIVE:     Vital Signs Range (Last 24H):  /67 (BP Location: Left arm, Patient Position: Lying)   Pulse (!) 52   Temp 98.3 °F (36.8 °C) (Oral)   Resp 16   Ht 5' 11" (1.803 m)   Wt 61.1 kg (134 lb 11.2 oz)   SpO2 98%   BMI 18.79 kg/m²     Temp:  [97.9 °F (36.6 °C)-98.9 °F (37.2 °C)]   Pulse:  [49-80]   Resp:  [14-19]   BP: ()/(55-92)   SpO2:  [95 %-99 %]     I & O (Last 24H):  Intake/Output Summary (Last 24 hours) at 8/8/2024 0911  Last data filed at 8/8/2024 0851  Gross per 24 hour   Intake 1476 ml   Output 750 ml   Net 726 ml       Physical Exam:  General appearance:frail/malnourished but much improved today  Eyes:  Conjunctivae/corneas clear. PERRL.  Lungs: Normal respiratory effort,   clear to auscultation bilaterally   Heart: Regular rate and rhythm, S1, S2 normal, no murmur, rub or isma.  Abdomen: Soft, min tender non-distended; bowel sounds +; " "no masses,  no organomegaly  Extremities: No cyanosis or clubbing. No edema.    Skin: Skin color, texture, turgor normal. No rashes or lesions  Neurologic: Normal strength and tone. No focal numbness or weakness     Laboratory Data:  Recent Labs   Lab 08/08/24  0510   WBC 8.33   RBC 4.40*   HGB 12.7*   HCT 38.3*      MCV 87   MCH 28.9   MCHC 33.2       BMP:   Recent Labs   Lab 08/08/24  0810   GLU 92      K 4.3      CO2 21*   BUN 23*   CREATININE 0.9   CALCIUM 9.7   PHOS 1.9*     Lab Results   Component Value Date    CALCIUM 9.7 08/08/2024    PHOS 1.9 (L) 08/08/2024       Lab Results   Component Value Date    .5 (H) 08/07/2024    CALCIUM 9.7 08/08/2024    CAION 1.34 08/08/2024    PHOS 1.9 (L) 08/08/2024       Lab Results   Component Value Date    URICACID 9.3 (H) 08/07/2024       BNP  No results for input(s): "BNP", "BNPTRIAGEBLO" in the last 168 hours.    Medications:  Medication list was reviewed and changes noted under Assessment/Plan.    Diagnostic Results:        ASSESSMENT/PLAN:     Resolved REMINGTON on unknown baseline (last creat 0.8 in 3/21) secondary to severe volume depletion/hypercalcemia/N/V/ plus other:  -CT w/o obstruction  -w/up ordered.  -IVF's for now and trend  -denies NSAIDs.   -8/8:  creat back to baseline.  -Renally dose meds, avoid nephrotoxins, and monitor I/O's closely.     Resolved Hypovolemic Hyponatremia:  -urine ordered but looks dry  -isotonic saline and monitor.     Hypophos:  -secondary to poor intake  -replace oral/IV     Resolving AGMA:  -secondary to above  -will alternative LR/Saline infusions until able to tolerate oral.     Severe hypercalcemia:  -last known Dereje of 11.3 back in 3/2021  -scheduled to see endo as outpt.  -checked PTH/Vit D/Ionized Calcium/Kappa chain  -IVF's/IVF's/IVFs  -checked PSA  -no lytic lesions on CT  -8/8:  unfortunately unable to do PTH scan until next week at this facility.  W/up indicating mild primary HTPH and severe dehydration.  " Will start low dose sensipar 30 mg (3/week) until seen by endo as outpt.       Abd pain w/ dark stools/N/V:  -consulted GI  -ok for PPI      Thanks for consult  See above  Ok to DC later today.  F/up with endocrine/GI  Nothing else to offer.      High MDM complexity necessary to treat or prevent imminent or life-threatening deterioration of the following conditions: REMINGTON, <NA/>Ca     Time spent personally by me on the following activities 90 min: development of treatment plan with patient or surrogate, discussions with consultants, interpretation of cardiac output measurements, examination of patient, ordering and performing treatments and interventions, evaluation of patient's response to treatment, obtaining history from patient or surrogate, ordering and review of laboratory studies, ordering and review of radiographic studies, re-evaluation of patient's condition, pulse oximetry and blood draw for specimens

## 2024-08-08 NOTE — NURSING
Pt bradycardic with HR in the 40s and currently 44. Pt asymptomatic resting quietly in bed. JULI Perdomo (Charge Nurse) notified [facility]. No new order received. Pt in no apparent distress at this time. Will continue to monitor.

## 2024-08-08 NOTE — PLAN OF CARE
MSW met with the patient at the bedside.     Patient is alert and oriented with no communication barriers.     Patient has a cane in the home.     Patients PCP is correct on the face sheet. Patient choice pharmacy is bedside delivery.     Patients' family will transport the patient home at discharge.     CM team will continue to follow.     08/08/24 0911   Discharge Assessment   Assessment Type Discharge Planning Assessment   Confirmed/corrected address, phone number and insurance Yes   Confirmed Demographics Correct on Facesheet   Source of Information patient;health record   People in Home alone   Do you expect to return to your current living situation? Yes   Do you have help at home or someone to help you manage your care at home? Yes   Prior to hospitilization cognitive status: Alert/Oriented   Current cognitive status: Alert/Oriented   Walking or Climbing Stairs Difficulty yes   Walking or Climbing Stairs ambulation difficulty, requires equipment   Dressing/Bathing Difficulty no   Equipment Currently Used at Home cane, straight   Readmission within 30 days? No   Patient currently being followed by outpatient case management? No   Do you currently have service(s) that help you manage your care at home? No   Do you take prescription medications? Yes   Do you have prescription coverage? Yes   Do you have any problems affording any of your prescribed medications? No   Is the patient taking medications as prescribed? yes   How do you get to doctors appointments? family or friend will provide;health plan transportation;public transportation   Are you on dialysis? No   Do you take coumadin? No   Discharge Plan A Home   Discharge Plan B Home Health   DME Needed Upon Discharge  none   Discharge Plan discussed with: Patient   Transition of Care Barriers None   Physical Activity   On average, how many days per week do you engage in moderate to strenuous exercise (like a brisk walk)? 0 days   On average, how many minutes  do you engage in exercise at this level? 0 min   Financial Resource Strain   How hard is it for you to pay for the very basics like food, housing, medical care, and heating? Not hard   Housing Stability   In the last 12 months, was there a time when you were not able to pay the mortgage or rent on time? N   At any time in the past 12 months, were you homeless or living in a shelter (including now)? N   Transportation Needs   Has the lack of transportation kept you from medical appointments, meetings, work or from getting things needed for daily living? No   Food Insecurity   Within the past 12 months, you worried that your food would run out before you got the money to buy more. Never true   Within the past 12 months, the food you bought just didn't last and you didn't have money to get more. Never true   Stress   Do you feel stress - tense, restless, nervous, or anxious, or unable to sleep at night because your mind is troubled all the time - these days? Not at all   Social Isolation   How often do you feel lonely or isolated from those around you?  Never   Alcohol Use   Q1: How often do you have a drink containing alcohol? Never   Q2: How many drinks containing alcohol do you have on a typical day when you are drinking? None   Q3: How often do you have six or more drinks on one occasion? Never   [facility]   In the past 12 months has the electric, gas, oil, or water company threatened to shut off services in your home? No   Health Literacy   How often do you need to have someone help you when you read instructions, pamphlets, or other written material from your doctor or pharmacy? Never

## 2024-08-08 NOTE — NURSING
Patient became very anxious and upset because the phone in his room was not working and his cell phone  battery . I asked the patient what I could do for him and that seemed to upset him because he got up and started cursing and yelling and requesting to leave. I was able to deescalate the situation and moved patient to Rm [id number]. Patient received pain meds and zofran and is laying in bed happy and content.

## 2024-08-08 NOTE — ASSESSMENT & PLAN NOTE
"Hypercalcemia is likely multifactorial - due to dehydration/volume depletion plus primary hyperparathyroidism.  Treat with IVF  Ionized calcium normal  See "REMINGTON"  "

## 2024-08-08 NOTE — HOSPITAL COURSE
Patient was admitted for severe REMINGTON primarily due to volume depletion.  He was seen by nephrology and treated with IV fluids with improvement in his labs.  GI saw him as well, felt his episode of BRBPR was likely related to hemorrhoids but recommended a non-urgent outpatient colonoscopy.  H/H was trended, he had an expected decrease to his baseline with rehydration.  Labs improved considerably with IV fluids and patient's abdominal pain improved as well, he was able to tolerate solid food.  He was seen and examined on the day of discharge and was anxious to go home.

## 2024-08-08 NOTE — SUBJECTIVE & OBJECTIVE
Interval History:  Doing much better - abdominal pain improved and he is hungry - needs solid food.  No further vomiting.    Review of Systems   Constitutional:  Negative for chills and fever.   Respiratory:  Negative for cough and shortness of breath.    Cardiovascular:  Negative for chest pain and palpitations.   Gastrointestinal:  Negative for nausea.     Objective:     Vital Signs (Most Recent):  Temp: 98.2 °F (36.8 °C) (08/08/24 1635)  Pulse: (!) 56 (08/08/24 1601)  Resp: 17 (08/08/24 1601)  BP: (!) 156/93 (08/08/24 1635)  SpO2: 99 % (08/08/24 1601) Vital Signs (24h Range):  Temp:  [97.9 °F (36.6 °C)-98.9 °F (37.2 °C)] 98.2 °F (36.8 °C)  Pulse:  [49-80] 56  Resp:  [16-18] 17  SpO2:  [95 %-99 %] 99 %  BP: ()/(55-93) 156/93     Weight: 61.1 kg (134 lb 11.2 oz)  Body mass index is 18.79 kg/m².    Intake/Output Summary (Last 24 hours) at 8/8/2024 1745  Last data filed at 8/8/2024 1736  Gross per 24 hour   Intake 1096 ml   Output 2151 ml   Net -1055 ml         Physical Exam  Cardiovascular:      Rate and Rhythm: Normal rate and regular rhythm.      Pulses: Normal pulses.      Heart sounds: Normal heart sounds. No murmur heard.     No gallop.   Pulmonary:      Effort: Pulmonary effort is normal.      Breath sounds: Normal breath sounds.   Abdominal:      General: Bowel sounds are normal.      Palpations: Abdomen is soft.   Skin:     General: Skin is warm and dry.   Neurological:      General: No focal deficit present.      Mental Status: He is alert and oriented to person, place, and time.             Significant Labs: All pertinent labs within the past 24 hours have been reviewed.    Significant Imaging: I have reviewed all pertinent imaging results/findings within the past 24 hours.

## 2024-08-08 NOTE — PROGRESS NOTES
[facility] Medicine  Progress Note    Patient Name: Ha Li  MRN: [id number]  Patient Class: IP- Inpatient   Admission Date: 8/7/2024  Length of Stay: 1 days  Attending Physician: Delfina Elder MD  Primary Care Provider: Rory Estrada MD        Subjective:     Principal Problem:REMINGTON (acute kidney injury)        HPI:  Mr. Li is a 58-year-old man who presented with persistent nausea, vomiting and abdominal pain that started 3 days ago.  Patient has not been able to eat anything during that time and he says he has lost a significant amount of weight.  He also had a single episode of bright red blood from his rectum while straining to have a bowel movement.  His urine has been dark and he has been urinating less.  He has a history of GERD for which he takes Prilosec as needed.  He also uses NSAIDs from time to time for aches and pains.  Workup in the ED was notable for BUN/creatinine 61/1.4, bicarb 16, anion gap 20, sodium 129, calcium 13.  Patient is being admitted for acute kidney injury and possible GI bleed.    Medical history includes hypertension, chronic GE reflux and BPH.  He is a  and has had various injuries while working.  He is a smoker, about 1/2 pack per day and he drinks a couple of beers although states this is not every day.  He has been noted to have hypercalcemia in the past and apparently has been referred to endocrinology for workup.              Overview/Hospital Course:  Patient was admitted for severe REMINGTON primarily due to volume depletion.  He was seen by nephrology and treated with IV fluids with improvement in his labs.  GI saw him as well, felt his episode of BRBPR was likely related to hemorrhoids.  H/H was trended, he had an expected decrease to his baseline with rehydration.  Labs improved considerably with IV fluids and patient's abdominal pain improved as well, he was able to tolerate solid food.    Interval History:  Doing  much better - abdominal pain improved and he is hungry - needs solid food.  No further vomiting.    Review of Systems   Constitutional:  Negative for chills and fever.   Respiratory:  Negative for cough and shortness of breath.    Cardiovascular:  Negative for chest pain and palpitations.   Gastrointestinal:  Negative for nausea.     Objective:     Vital Signs (Most Recent):  Temp: 98.2 °F (36.8 °C) (08/08/24 1635)  Pulse: (!) 56 (08/08/24 1601)  Resp: 17 (08/08/24 1601)  BP: (!) 156/93 (08/08/24 1635)  SpO2: 99 % (08/08/24 1601) Vital Signs (24h Range):  Temp:  [97.9 °F (36.6 °C)-98.9 °F (37.2 °C)] 98.2 °F (36.8 °C)  Pulse:  [49-80] 56  Resp:  [16-18] 17  SpO2:  [95 %-99 %] 99 %  BP: ()/(55-93) 156/93     Weight: 61.1 kg (134 lb 11.2 oz)  Body mass index is 18.79 kg/m².    Intake/Output Summary (Last 24 hours) at 8/8/2024 1745  Last data filed at 8/8/2024 1736  Gross per 24 hour   Intake 1096 ml   Output 2151 ml   Net -1055 ml         Physical Exam  Cardiovascular:      Rate and Rhythm: Normal rate and regular rhythm.      Pulses: Normal pulses.      Heart sounds: Normal heart sounds. No murmur heard.     No gallop.   Pulmonary:      Effort: Pulmonary effort is normal.      Breath sounds: Normal breath sounds.   Abdominal:      General: Bowel sounds are normal.      Palpations: Abdomen is soft.   Skin:     General: Skin is warm and dry.   Neurological:      General: No focal deficit present.      Mental Status: He is alert and oriented to person, place, and time.             Significant Labs: All pertinent labs within the past 24 hours have been reviewed.    Significant Imaging: I have reviewed all pertinent imaging results/findings within the past 24 hours.    Assessment/Plan:      * REMINGTON (acute kidney injury)  Anion gap metabolic acidosis  Volume depletion  Hyperuricemia  Hypercalcemia  Hyponatremia  - Patient presented with severe volume depletion, electrolyte derangement and REMINGTON after several days of  "vomiting and poor oral intake  - Baseline labs unclear, did not have evidence of CKD several years ago but has had hypercalcemia in the past and was referred to endocrinology  - History of BPH and has decreased UOP but no obstruction noted on CT (no hydronephrosis)  - Treating with aggressive IVF, nephrology consulted.  Additional labs ordered and pending - ionized calcium, PSA, Kappa/Lambda light chains  - Additional findings of hyperuricemia, hyponatremia, severe hypercalcemia, elevated PTH (consistent with primary hyperparathyroidism).  - Serial labs ordered, has had good improvement with IVF.    Volume depletion, gastrointestinal loss  See "REMINGTON"      Hypercalcemia  Hypercalcemia is likely multifactorial - due to dehydration/volume depletion plus primary hyperparathyroidism.  Treat with IVF  Ionized calcium normal  See "REMINGTON"    Metabolic acidosis, increased anion gap (IAG)  See "REMINGTON"  Anion gap closed with hydration, bicarb still on the low side.      Blood in stool  Single episode BRBPR, GI has evaluated and his abdominal pain had resolved   Has history of hemorrhoids which is felt to be the most likely cause of the BRBPR.  Will monitor      Primary hypertension  CHome meds for hypertension were reviewed and noted below.   Hypertension Medications               amLODIPine (NORVASC) 10 MG tablet Take 10 mg by mouth once daily.            While in the hospital, will continue home medications  BP now on the low side so will hold amlodipine today    Tobacco abuse  Will discuss with patient.        VTE Risk Mitigation (From admission, onward)           Ordered     IP VTE LOW RISK PATIENT  Once         08/07/24 0843     Place sequential compression device  Until discontinued         08/07/24 0843                    Discharge Planning   KARL: 8/10/2024     Code Status: Full Code   Is the patient medically ready for discharge?:     Reason for patient still in hospital (select all that apply): Patient trending condition, " Laboratory test, and Consult recommendations  Discharge Plan A: Home                  Delfina Strong MD  Department of Hospital Medicine   [facility] Surg ([place])

## 2024-08-08 NOTE — PLAN OF CARE
Patient had no adverse events this shift. Patient free of falls, call light in reach, bed alarm set, X2 side rails up. Patient repositions independently. Pain managed with ordered medication. IVF administered as ordered. VSS. Chart Reviewed.    Problem: Adult Inpatient Plan of Care  Goal: Plan of Care Review  Outcome: Progressing  Goal: Patient-Specific Goal (Individualized)  Outcome: Progressing  Goal: Absence of Hospital-Acquired Illness or Injury  Outcome: Progressing  Goal: Optimal Comfort and Wellbeing  Outcome: Progressing  Goal: Readiness for Transition of Care  Outcome: Progressing     Problem: Acute Kidney Injury/Impairment  Goal: Fluid and Electrolyte Balance  Outcome: Progressing  Goal: Improved Oral Intake  Outcome: Progressing  Goal: Effective Renal Function  Outcome: Progressing     Problem: Skin Injury Risk Increased  Goal: Skin Health and Integrity  Outcome: Progressing

## 2024-08-09 VITALS
BODY MASS INDEX: 18.85 KG/M2 | OXYGEN SATURATION: 99 % | TEMPERATURE: 98 F | RESPIRATION RATE: 16 BRPM | HEART RATE: 53 BPM | WEIGHT: 134.69 LBS | DIASTOLIC BLOOD PRESSURE: 75 MMHG | HEIGHT: 71 IN | SYSTOLIC BLOOD PRESSURE: 139 MMHG

## 2024-08-09 LAB
ALBUMIN SERPL BCP-MCNC: 3.3 G/DL (ref 3.5–5.2)
ANION GAP SERPL CALC-SCNC: 7 MMOL/L (ref 8–16)
BASOPHILS # BLD AUTO: 0.04 K/UL (ref 0–0.2)
BASOPHILS NFR BLD: 0.6 % (ref 0–1.9)
BUN SERPL-MCNC: 22 MG/DL (ref 6–20)
CA-I BLDV-SCNC: 1.31 MMOL/L (ref 1.06–1.42)
CALCIUM SERPL-MCNC: 9.8 MG/DL (ref 8.7–10.5)
CHLORIDE SERPL-SCNC: 107 MMOL/L (ref 95–110)
CO2 SERPL-SCNC: 25 MMOL/L (ref 23–29)
CREAT SERPL-MCNC: 0.9 MG/DL (ref 0.5–1.4)
DIFFERENTIAL METHOD BLD: ABNORMAL
EOSINOPHIL # BLD AUTO: 0.1 K/UL (ref 0–0.5)
EOSINOPHIL NFR BLD: 2 % (ref 0–8)
ERYTHROCYTE [DISTWIDTH] IN BLOOD BY AUTOMATED COUNT: 13.5 % (ref 11.5–14.5)
EST. GFR  (NO RACE VARIABLE): >60 ML/MIN/1.73 M^2
GLUCOSE SERPL-MCNC: 86 MG/DL (ref 70–110)
HCT VFR BLD AUTO: 39.6 % (ref 40–54)
HGB BLD-MCNC: 12.7 G/DL (ref 14–18)
IMM GRANULOCYTES # BLD AUTO: 0.01 K/UL (ref 0–0.04)
IMM GRANULOCYTES NFR BLD AUTO: 0.1 % (ref 0–0.5)
LYMPHOCYTES # BLD AUTO: 2.3 K/UL (ref 1–4.8)
LYMPHOCYTES NFR BLD: 33.8 % (ref 18–48)
MCH RBC QN AUTO: 28.5 PG (ref 27–31)
MCHC RBC AUTO-ENTMCNC: 32.1 G/DL (ref 32–36)
MCV RBC AUTO: 89 FL (ref 82–98)
MONOCYTES # BLD AUTO: 0.7 K/UL (ref 0.3–1)
MONOCYTES NFR BLD: 10.3 % (ref 4–15)
NEUTROPHILS # BLD AUTO: 3.7 K/UL (ref 1.8–7.7)
NEUTROPHILS NFR BLD: 53.2 % (ref 38–73)
NRBC BLD-RTO: 0 /100 WBC
PHOSPHATE SERPL-MCNC: 2.1 MG/DL (ref 2.7–4.5)
PLATELET # BLD AUTO: 232 K/UL (ref 150–450)
PMV BLD AUTO: 10.3 FL (ref 9.2–12.9)
POTASSIUM SERPL-SCNC: 4 MMOL/L (ref 3.5–5.1)
RBC # BLD AUTO: 4.45 M/UL (ref 4.6–6.2)
SODIUM SERPL-SCNC: 139 MMOL/L (ref 136–145)
WBC # BLD AUTO: 6.89 K/UL (ref 3.9–12.7)

## 2024-08-09 PROCEDURE — 80069 RENAL FUNCTION PANEL: CPT | Performed by: NURSE PRACTITIONER

## 2024-08-09 PROCEDURE — 82330 ASSAY OF CALCIUM: CPT | Performed by: NURSE PRACTITIONER

## 2024-08-09 PROCEDURE — 36415 COLL VENOUS BLD VENIPUNCTURE: CPT | Performed by: NURSE PRACTITIONER

## 2024-08-09 PROCEDURE — A4216 STERILE WATER/SALINE, 10 ML: HCPCS | Performed by: HOSPITALIST

## 2024-08-09 PROCEDURE — 63600175 PHARM REV CODE 636 W HCPCS: Performed by: NURSE PRACTITIONER

## 2024-08-09 PROCEDURE — 25000003 PHARM REV CODE 250: Performed by: HOSPITALIST

## 2024-08-09 PROCEDURE — 85025 COMPLETE CBC W/AUTO DIFF WBC: CPT | Performed by: HOSPITALIST

## 2024-08-09 RX ADMIN — SODIUM CHLORIDE, POTASSIUM CHLORIDE, SODIUM LACTATE AND CALCIUM CHLORIDE: 600; 310; 30; 20 INJECTION, SOLUTION INTRAVENOUS at 03:08

## 2024-08-09 RX ADMIN — DIVALPROEX SODIUM 250 MG: 250 TABLET, DELAYED RELEASE ORAL at 08:08

## 2024-08-09 RX ADMIN — Medication 10 ML: at 06:08

## 2024-08-09 RX ADMIN — TAMSULOSIN HYDROCHLORIDE 0.4 MG: 0.4 CAPSULE ORAL at 08:08

## 2024-08-09 NOTE — PLAN OF CARE
Patient will discharge home. Patient will schedule own follow up appointments. Patient will catch the bus home. All CM needs have been met.    08/09/24 1139   Final Note   Assessment Type Final Discharge Note   Anticipated Discharge Disposition Home   Hospital Resources/Appts/Education Provided Provided patient/caregiver with written discharge plan information   Post-Acute Status   Discharge Delays None known at this time     [facility] - Med Surg ([place])  Discharge Final Note    Primary Care Provider: Rory Estrada MD    Expected Discharge Date: 8/9/2024    Final Discharge Note (most recent)       Final Note - 08/09/24 1139          Final Note    Assessment Type Final Discharge Note (P)      Anticipated Discharge Disposition Home or Self Care (P)      Hospital Resources/Appts/Education Provided Provided patient/caregiver with written discharge plan information (P)         Post-Acute Status    Discharge Delays None known at this time (P)                      Important Message from Medicare

## 2024-08-09 NOTE — PLAN OF CARE
The patient remained free from injury during shift.  Respirations even and unlabored.  No s/s of distress noted.  Medications administered as ordered and tolerated well.

## 2024-08-09 NOTE — PLAN OF CARE
Problem: Adult Inpatient Plan of Care  Goal: Plan of Care Review  Outcome: Adequate for Care Transition  Goal: Patient-Specific Goal (Individualized)  Outcome: Adequate for Care Transition  Goal: Absence of Hospital-Acquired Illness or Injury  Outcome: Adequate for Care Transition  Goal: Optimal Comfort and Wellbeing  Outcome: Adequate for Care Transition  Goal: Readiness for Transition of Care  Outcome: Adequate for Care Transition     Problem: Acute Kidney Injury/Impairment  Goal: Fluid and Electrolyte Balance  Outcome: Adequate for Care Transition  Goal: Improved Oral Intake  Outcome: Adequate for Care Transition  Goal: Effective Renal Function  Outcome: Adequate for Care Transition     Problem: Skin Injury Risk Increased  Goal: Skin Health and Integrity  Outcome: Adequate for Care Transition

## 2024-08-09 NOTE — DISCHARGE SUMMARY
[facility] Medicine  Discharge Summary      Patient Name: Ha Li  MRN: [id number]  MOHSEN: [id number]  Patient Class: IP- Inpatient  Admission Date: 8/7/2024  Hospital Length of Stay: 2 days  Discharge Date and Time: 8/9/2024  1:45 PM  Attending Physician: Daria att. providers found   Discharging Provider: Delfina Strong MD  Primary Care Provider: Rory Estrada MD    Primary Care Team: Networked reference to record PCT     HPI:   Mr. Li is a 58-year-old man who presented with persistent nausea, vomiting and abdominal pain that started 3 days ago.  Patient has not been able to eat anything during that time and he says he has lost a significant amount of weight.  He also had a single episode of bright red blood from his rectum while straining to have a bowel movement.  His urine has been dark and he has been urinating less.  He has a history of GERD for which he takes Prilosec as needed.  He also uses NSAIDs from time to time for aches and pains.  Workup in the ED was notable for BUN/creatinine 61/1.4, bicarb 16, anion gap 20, sodium 129, calcium 13.  Patient is being admitted for acute kidney injury and possible GI bleed.    Medical history includes hypertension, chronic GE reflux and BPH.  He is a  and has had various injuries while working.  He is a smoker, about 1/2 pack per day and he drinks a couple of beers although states this is not every day.  He has been noted to have hypercalcemia in the past and apparently has been referred to endocrinology for workup.                    Hospital Course:   Patient was admitted for severe REMINGTON primarily due to volume depletion.  He was seen by nephrology and treated with IV fluids with improvement in his labs.  GI saw him as well, felt his episode of BRBPR was likely related to hemorrhoids but recommended a non-urgent outpatient colonoscopy.  H/H was trended, he had an expected decrease to his baseline with  rehydration.  Labs improved considerably with IV fluids and patient's abdominal pain improved as well, he was able to tolerate solid food.  He was seen and examined on the day of discharge and was anxious to go home.     Goals of Care Treatment Preferences:  Code Status: Full Code      SDOH Screening:  The patient was screened for utility difficulties, food insecurity, transport difficulties, housing insecurity, and interpersonal safety and there were no concerns identified this admission.     Consults:   Consults (From admission, onward)          Status Ordering Provider     Inpatient consult to Gastroenterology  Once        Provider:  Magdalene Lubin MD    Completed DANE MERAZ     [facility]        Provider:  Diaz Terry NP    Completed DANE MERAZ              Final Active Diagnoses:    Diagnosis Date Noted POA    PRINCIPAL PROBLEM:  REMINGTON (acute kidney injury) [N17.9] 08/07/2024 Yes    Volume depletion, gastrointestinal loss [E86.9] 08/07/2024 Yes    Hypercalcemia [E83.52] 03/01/2017 Yes    Primary hypertension [I10] 08/07/2024 Yes    Blood in stool [K92.1] 08/07/2024 Yes    Metabolic acidosis, increased anion gap (IAG) [E87.29] 08/07/2024 Yes    Tobacco abuse [Z72.0] 03/02/2017 Yes      Problems Resolved During this Admission:       Discharged Condition: stable    Disposition: Home or Self Care    Follow Up:    Patient Instructions:      Diet Adult Regular     Activity as tolerated         Medications:  Reconciled Home Medications:      Medication List        CONTINUE taking these medications      amLODIPine 10 MG tablet  Commonly known as: NORVASC  Take 10 mg by mouth once daily.     divalproex 250 MG EC tablet  Commonly known as: DEPAKOTE  Take 250 mg by mouth 2 (two) times daily.     omeprazole 40 MG capsule  Commonly known as: PRILOSEC  Take 40 mg by mouth every morning.     tamsulosin 0.4 mg Cap  Commonly known as: FLOMAX  Take 0.4 mg by mouth once  daily.              Time spent on the discharge of patient: >30 minutes         Delfina Strong MD  Department of Hospital Medicine  [facility] - Med Surg ([place])

## 2025-03-31 ENCOUNTER — HOSPITAL ENCOUNTER (EMERGENCY)
Facility: OTHER | Age: 60
Discharge: HOME | End: 2025-03-31
Attending: EMERGENCY MEDICINE
Payer: MEDICAID

## 2025-03-31 VITALS
OXYGEN SATURATION: 96 % | TEMPERATURE: 99 F | HEART RATE: 52 BPM | RESPIRATION RATE: 18 BRPM | DIASTOLIC BLOOD PRESSURE: 91 MMHG | SYSTOLIC BLOOD PRESSURE: 194 MMHG

## 2025-03-31 DIAGNOSIS — I10 HYPERTENSION, UNSPECIFIED TYPE: Primary | ICD-10-CM

## 2025-03-31 DIAGNOSIS — R10.13 EPIGASTRIC PAIN: ICD-10-CM

## 2025-03-31 DIAGNOSIS — K21.9 GASTROESOPHAGEAL REFLUX DISEASE, UNSPECIFIED WHETHER ESOPHAGITIS PRESENT: ICD-10-CM

## 2025-03-31 LAB
ABSOLUTE EOSINOPHIL (OHS): 0.01 K/UL
ABSOLUTE MONOCYTE (OHS): 1.38 K/UL (ref 0.3–1)
ABSOLUTE NEUTROPHIL COUNT (OHS): 9.71 K/UL (ref 1.8–7.7)
ALBUMIN SERPL BCP-MCNC: 4.3 G/DL (ref 3.5–5.2)
ALP SERPL-CCNC: 77 UNIT/L (ref 40–150)
ALT SERPL W/O P-5'-P-CCNC: 20 UNIT/L (ref 10–44)
ANION GAP (OHS): 11 MMOL/L (ref 8–16)
AST SERPL-CCNC: 31 UNIT/L (ref 11–45)
BASOPHILS # BLD AUTO: 0.02 K/UL
BASOPHILS NFR BLD AUTO: 0.2 %
BILIRUB SERPL-MCNC: 1 MG/DL (ref 0.1–1)
BUN SERPL-MCNC: 17 MG/DL (ref 6–20)
CALCIUM SERPL-MCNC: 11.6 MG/DL (ref 8.7–10.5)
CHLORIDE SERPL-SCNC: 97 MMOL/L (ref 95–110)
CO2 SERPL-SCNC: 22 MMOL/L (ref 23–29)
CREAT SERPL-MCNC: 1 MG/DL (ref 0.5–1.4)
CREAT SERPL-MCNC: 1 MG/DL (ref 0.5–1.4)
ERYTHROCYTE [DISTWIDTH] IN BLOOD BY AUTOMATED COUNT: 14.8 % (ref 11.5–14.5)
GFR SERPLBLD CREATININE-BSD FMLA CKD-EPI: >60 ML/MIN/1.73/M2
GLUCOSE SERPL-MCNC: 107 MG/DL (ref 70–110)
HCT VFR BLD AUTO: 47.9 % (ref 40–54)
HGB BLD-MCNC: 16 GM/DL (ref 14–18)
IMM GRANULOCYTES # BLD AUTO: 0.04 K/UL (ref 0–0.04)
IMM GRANULOCYTES NFR BLD AUTO: 0.3 % (ref 0–0.5)
LIPASE SERPL-CCNC: 16 U/L (ref 4–60)
LYMPHOCYTES # BLD AUTO: 1.8 K/UL (ref 1–4.8)
MCH RBC QN AUTO: 30.2 PG (ref 27–31)
MCHC RBC AUTO-ENTMCNC: 33.4 G/DL (ref 32–36)
MCV RBC AUTO: 91 FL (ref 82–98)
NUCLEATED RBC (/100WBC) (OHS): 0 /100 WBC
PLATELET # BLD AUTO: 348 K/UL (ref 150–450)
PMV BLD AUTO: 10.3 FL (ref 9.2–12.9)
POTASSIUM SERPL-SCNC: 4.6 MMOL/L (ref 3.5–5.1)
PROT SERPL-MCNC: 8.6 GM/DL (ref 6–8.4)
RBC # BLD AUTO: 5.29 M/UL (ref 4.6–6.2)
RELATIVE EOSINOPHIL (OHS): 0.1 %
RELATIVE LYMPHOCYTE (OHS): 13.9 % (ref 18–48)
RELATIVE MONOCYTE (OHS): 10.6 % (ref 4–15)
RELATIVE NEUTROPHIL (OHS): 74.9 % (ref 38–73)
SAMPLE: NORMAL
SODIUM SERPL-SCNC: 130 MMOL/L (ref 136–145)
WBC # BLD AUTO: 12.96 K/UL (ref 3.9–12.7)

## 2025-03-31 PROCEDURE — 96372 THER/PROPH/DIAG INJ SC/IM: CPT | Performed by: EMERGENCY MEDICINE

## 2025-03-31 PROCEDURE — 25500020 PHARM REV CODE 255: Performed by: EMERGENCY MEDICINE

## 2025-03-31 PROCEDURE — 99285 EMERGENCY DEPT VISIT HI MDM: CPT | Mod: 25

## 2025-03-31 PROCEDURE — 63600175 PHARM REV CODE 636 W HCPCS: Performed by: EMERGENCY MEDICINE

## 2025-03-31 PROCEDURE — 25000003 PHARM REV CODE 250: Performed by: EMERGENCY MEDICINE

## 2025-03-31 PROCEDURE — 85025 COMPLETE CBC W/AUTO DIFF WBC: CPT | Performed by: EMERGENCY MEDICINE

## 2025-03-31 PROCEDURE — 83690 ASSAY OF LIPASE: CPT | Performed by: EMERGENCY MEDICINE

## 2025-03-31 PROCEDURE — 96374 THER/PROPH/DIAG INJ IV PUSH: CPT | Mod: 59

## 2025-03-31 PROCEDURE — 94761 N-INVAS EAR/PLS OXIMETRY MLT: CPT

## 2025-03-31 PROCEDURE — 84075 ASSAY ALKALINE PHOSPHATASE: CPT | Performed by: EMERGENCY MEDICINE

## 2025-03-31 RX ORDER — LORAZEPAM 2 MG/ML
1 INJECTION INTRAMUSCULAR
Status: DISCONTINUED | OUTPATIENT
Start: 2025-03-31 | End: 2025-03-31 | Stop reason: HOSPADM

## 2025-03-31 RX ORDER — OMEPRAZOLE 40 MG/1
40 CAPSULE, DELAYED RELEASE ORAL EVERY MORNING
Qty: 90 CAPSULE | Refills: 0 | Status: SHIPPED | OUTPATIENT
Start: 2025-03-31

## 2025-03-31 RX ORDER — DROPERIDOL 2.5 MG/ML
1.25 INJECTION, SOLUTION INTRAMUSCULAR; INTRAVENOUS
Status: COMPLETED | OUTPATIENT
Start: 2025-03-31 | End: 2025-03-31

## 2025-03-31 RX ORDER — DROPERIDOL 2.5 MG/ML
0.62 INJECTION, SOLUTION INTRAMUSCULAR; INTRAVENOUS ONCE
Status: COMPLETED | OUTPATIENT
Start: 2025-03-31 | End: 2025-03-31

## 2025-03-31 RX ORDER — FAMOTIDINE 10 MG/ML
20 INJECTION, SOLUTION INTRAVENOUS
Status: COMPLETED | OUTPATIENT
Start: 2025-03-31 | End: 2025-03-31

## 2025-03-31 RX ORDER — DIVALPROEX SODIUM 250 MG/1
250 TABLET, DELAYED RELEASE ORAL
Status: DISCONTINUED | OUTPATIENT
Start: 2025-03-31 | End: 2025-03-31 | Stop reason: HOSPADM

## 2025-03-31 RX ORDER — LIDOCAINE HYDROCHLORIDE 20 MG/ML
15 SOLUTION OROPHARYNGEAL ONCE
Status: COMPLETED | OUTPATIENT
Start: 2025-03-31 | End: 2025-03-31

## 2025-03-31 RX ORDER — AMLODIPINE BESYLATE 5 MG/1
10 TABLET ORAL
Status: COMPLETED | OUTPATIENT
Start: 2025-03-31 | End: 2025-03-31

## 2025-03-31 RX ORDER — ALUMINUM HYDROXIDE, MAGNESIUM HYDROXIDE, AND SIMETHICONE 1200; 120; 1200 MG/30ML; MG/30ML; MG/30ML
30 SUSPENSION ORAL ONCE
Status: COMPLETED | OUTPATIENT
Start: 2025-03-31 | End: 2025-03-31

## 2025-03-31 RX ADMIN — LIDOCAINE HYDROCHLORIDE 15 ML: 20 SOLUTION ORAL at 11:03

## 2025-03-31 RX ADMIN — DROPERIDOL 0.62 MG: 2.5 INJECTION, SOLUTION INTRAMUSCULAR; INTRAVENOUS at 07:03

## 2025-03-31 RX ADMIN — IOHEXOL 75 ML: 350 INJECTION, SOLUTION INTRAVENOUS at 05:03

## 2025-03-31 RX ADMIN — ALUMINUM HYDROXIDE, MAGNESIUM HYDROXIDE, AND DIMETHICONE 30 ML: 200; 20; 200 SUSPENSION ORAL at 11:03

## 2025-03-31 RX ADMIN — DROPERIDOL 1.25 MG: 2.5 INJECTION, SOLUTION INTRAMUSCULAR; INTRAVENOUS at 12:03

## 2025-03-31 RX ADMIN — FAMOTIDINE 20 MG: 10 INJECTION, SOLUTION INTRAVENOUS at 05:03

## 2025-03-31 RX ADMIN — AMLODIPINE BESYLATE 10 MG: 5 TABLET ORAL at 07:03

## 2025-03-31 NOTE — ED NOTES
Pt to ED via [facility], c/o abd pain. Hx of GERD, s/s began after eating red gravy per EMS. NADN. Pt hypertensive, VSS otherwise.

## 2025-03-31 NOTE — ED PROVIDER NOTES
Encounter Date: 3/31/2025       History     Chief Complaint   Patient presents with    Abdominal Pain     N/V, abd pain since eating red gravy, Hx of GERD.     58 y/o with nausea, vomiting and upper abdominal discomfort since eating red gravy.  History of GERD.  No chest pain or SOB. C/o stomach discomfort, vomiting while being interviewed.      The history is provided by the patient.     Review of patient's allergies indicates:  No Known Allergies  Past Medical History:   Diagnosis Date    Chronic prostatitis 3/2/2017    GERD without esophagitis     Hepatic steatosis 3/2/2017    Per CT scan     History reviewed. No pertinent surgical history.  Family History   Problem Relation Name Age of Onset    Hypertension Mother      Hyperlipidemia Mother      Coronary artery disease Mother      Pacemaker/defibrilator Mother      Diabetes type II Mother      Diabetes type II Father      Hypertension Father      Hyperlipidemia Father      Coronary artery disease Father       Social History[1]  Review of Systems   Constitutional:  Negative for fever.   HENT:  Negative for sore throat.    Respiratory:  Negative for shortness of breath.    Cardiovascular:  Negative for chest pain.   Gastrointestinal:  Positive for abdominal pain, nausea and vomiting. Negative for diarrhea.   Genitourinary:  Negative for dysuria.   Musculoskeletal:  Negative for back pain.   Skin:  Negative for rash.   Neurological:  Negative for weakness.   Hematological:  Does not bruise/bleed easily.   All other systems reviewed and are negative.      Physical Exam     Initial Vitals [03/31/25 1135]   BP Pulse Resp Temp SpO2   (!) 198/98 97 18 97.7 °F (36.5 °C) 100 %      MAP       --         Physical Exam    Nursing note and vitals reviewed.  Constitutional: He appears well-developed and well-nourished. He appears distressed.   Actively vomiting.    HENT:   Head: Normocephalic and atraumatic.   Eyes: Conjunctivae and EOM are normal. Pupils are equal, round, and  reactive to light.   Neck: Neck supple.   Normal range of motion.  Cardiovascular:  Normal rate, regular rhythm, normal heart sounds and intact distal pulses.     Exam reveals no gallop and no friction rub.       No murmur heard.  Pulmonary/Chest: Breath sounds normal. No stridor. No respiratory distress. He has no wheezes. He has no rhonchi. He has no rales. He exhibits no tenderness.   Abdominal: Abdomen is soft. Bowel sounds are normal. He exhibits no distension. There is no abdominal tenderness. There is no rebound and no guarding.   Musculoskeletal:         General: Normal range of motion.      Cervical back: Normal range of motion and neck supple.     Neurological: He is alert and oriented to person, place, and time. He has normal strength. No cranial nerve deficit. GCS score is 15. GCS eye subscore is 4. GCS verbal subscore is 5. GCS motor subscore is 6.   Skin: Skin is warm and dry. Capillary refill takes less than 2 seconds.   Psychiatric: He has a normal mood and affect.         ED Course   Procedures  Labs Reviewed   COMPREHENSIVE METABOLIC PANEL - Abnormal       Result Value    Sodium 130 (*)     Potassium 4.6      Chloride 97      CO2 22 (*)     Glucose 107      BUN 17      Creatinine 1.0      Calcium 11.6 (*)     Protein Total 8.6 (*)     Albumin 4.3      Bilirubin Total 1.0      ALP 77      AST 31      ALT 20      Anion Gap 11      eGFR >60     CBC WITH DIFFERENTIAL - Abnormal    WBC 12.96 (*)     RBC 5.29      HGB 16.0      HCT 47.9      MCV 91      MCH 30.2      MCHC 33.4      RDW 14.8 (*)     Platelet Count 348      MPV 10.3      Nucleated RBC 0      Neut % 74.9 (*)     Lymph % 13.9 (*)     Mono % 10.6      Eos % 0.1      Basophil % 0.2      Imm Grans % 0.3      Neut # 9.71 (*)     Lymph # 1.80      Mono # 1.38 (*)     Eos # 0.01      Baso # 0.02      Imm Grans # 0.04     LIPASE - Normal    Lipase Level 16     CBC W/ AUTO DIFFERENTIAL    Narrative:     The following orders were created for panel  order CBC W/ AUTO DIFFERENTIAL.  Procedure                               Abnormality         Status                     ---------                               -----------         ------                     CBC with Differential[[id number]]       Abnormal            Final result                 Please view results for these tests on the individual orders.   ISTAT CREATININE    POC Creatinine 1.0      Sample VENOUS            Imaging Results              CT Abdomen Pelvis With IV Contrast NO Oral Contrast (Final result)  Result time 03/31/25 18:03:55      Final result by Nalini Farrell MD (03/31/25 18:03:55)                   Impression:      Limited examination.  Prominent liver.  No intrahepatic biliary ductal dilatation.    Patulous rectum containing small to moderate fecal material    Bilateral subcentimeter renal cysts.    Anasarca.    Prostatomegaly.      Electronically signed by: Nalini Farrell  Date:    03/31/2025  Time:    18:03               Narrative:    EXAMINATION:  CT OF ABDOMEN PELVIS WITH    CLINICAL HISTORY:  Abdominal abscess/infection suspected;LLQ abdominal pain;    TECHNIQUE:  5 mm enhanced axial images were obtained from the lung bases through the greater trochanters.  Seventy-five mL of Omnipaque 350 was injected.    COMPARISON:  08/07/2024    FINDINGS:  Examination is limited by anasarca and limited internal body fat.  The liver is mildly enlarged.  No intrahepatic biliary ductal dilatation is detected.    Spleen, pancreas, and adrenal glands are unremarkable. The gallbladder contains no calcified gallstones.    There are bilateral too small to characterize low attenuation lesions in bilateral renal cortices, which may represent subcentimeter cysts.    There is no definite evidence for abdominal adenopathy or ascites.  Mild atherosclerotic changes are present.    The rectum is patulous and contains small to moderate fecal material.  There are no pelvic masses or adenopathy.  The  prostate gland measures up to 6 mm in maximal diameter and contains a few tiny coarse calcifications.  Consider correlation with PSA.    There is no free fluid in the pelvis.    The lung bases are essentially clear.    There is significant degenerative change from L4 through S1, including intervertebral disc space narrowing, endplate sclerosis, prominent bridging osteophytes and vacuum phenomena.                                       Medications   aluminum-magnesium hydroxide-simethicone 200-200-20 mg/5 mL suspension 30 mL (30 mLs Oral Given 3/31/25 1149)     And   LIDOcaine viscous HCl 2% oral solution 15 mL (15 mLs Oral Given 3/31/25 1149)   droPERidol injection 1.25 mg (1.25 mg Intramuscular Given 3/31/25 1232)   famotidine (PF) injection 20 mg (20 mg Intravenous Given 3/31/25 1701)   iohexoL (OMNIPAQUE 350) injection 75 mL (75 mLs Intravenous Given 3/31/25 1717)   amLODIPine tablet 10 mg (10 mg Oral Given 3/31/25 1901)   droPERidol injection 0.625 mg (0.625 mg Intramuscular Given 3/31/25 1901)     Medical Decision Making  60 y/o M with nausea/vomiting following eating red gravy.  Suspect GERD exacerbation/gastritis.  Will check labs, give GI cocktail, IV fluids and antiemetics.  Do not suspect cardiac cause.      Differential diagnosis includes, but is not limited to:    Gastritis, viral gastroenteritis, pancreatitis, cholecystitis, ileus, small bowel obstruction, appendicitis.      Amount and/or Complexity of Data Reviewed  Labs: ordered.  Radiology: ordered.    Risk  OTC drugs.  Prescription drug management.                                This patient was discussed with Dr. Paul at shift change including presentation, testing thus far and pending testing and treatment which includes CT abdomen pelvis.  Anticipated disposition is home if CT reassuring.        Clinical Impression:  Final diagnoses:  [I10] Hypertension, unspecified type (Primary)  [R10.13] Epigastric pain  [K21.9] Gastroesophageal reflux  disease, unspecified whether esophagitis present          ED Disposition Condition    Discharge Stable          ED Prescriptions       Medication Sig Dispense Start Date End Date Auth. Provider    omeprazole (PRILOSEC) 40 MG capsule Take 1 capsule (40 mg total) by mouth every morning. 90 capsule 3/31/2025 -- Gt Paul MD          Follow-up Information       Follow up With Specialties Details Why Contact Info    Rory Estrada MD Internal Medicine Call in 1 day If symptoms worsen, For a follow up visit about today [address] [place] [address] [place] ENGLE [address]  [phone]                   [1]   Social History  Tobacco Use    Smoking status: Every Day     Current packs/day: 0.50     Average packs/day: 0.5 packs/day for 20.0 years (10.0 ttl pk-yrs)     Types: Cigarettes    Smokeless tobacco: Never   Substance Use Topics    Alcohol use: Yes     Comment: 4 40oz a week    Drug use: Yes     Types: Marijuana        Dianne Potts MD  05/05/25 [id number]

## 2025-04-01 NOTE — DISCHARGE INSTRUCTIONS
Mr. Li,    Thank you for letting me care for you today! It was nice meeting you, and I hope you feel better soon.   If you would like access to your chart and what was done today please utilize the [facility].   Please come back to [facility] for all of your future medical needs.    Our goal in the emergency department is to always give you outstanding care and exceptional service. You may receive a survey by mail or e-mail in the next week regarding your experience in our ED. We would greatly appreciate you completing and returning the survey. Your feedback provides us with a way to recognize our staff who give very good care and it helps us learn how to improve when your experience was below our aspiration of excellence.     Sincerely,    Gt Paul MD  Board Certified Emergency Physician